# Patient Record
Sex: FEMALE | Race: WHITE | NOT HISPANIC OR LATINO | Employment: UNEMPLOYED | URBAN - METROPOLITAN AREA
[De-identification: names, ages, dates, MRNs, and addresses within clinical notes are randomized per-mention and may not be internally consistent; named-entity substitution may affect disease eponyms.]

---

## 2017-01-23 ENCOUNTER — OFFICE VISIT (OUTPATIENT)
Dept: URGENT CARE | Facility: CLINIC | Age: 47
End: 2017-01-23
Payer: COMMERCIAL

## 2017-01-23 VITALS
HEIGHT: 67 IN | TEMPERATURE: 98 F | OXYGEN SATURATION: 94 % | WEIGHT: 204.6 LBS | SYSTOLIC BLOOD PRESSURE: 118 MMHG | HEART RATE: 81 BPM | RESPIRATION RATE: 14 BRPM | DIASTOLIC BLOOD PRESSURE: 78 MMHG | BODY MASS INDEX: 32.11 KG/M2

## 2017-01-23 DIAGNOSIS — H00.012 HORDEOLUM EXTERNUM OF RIGHT LOWER EYELID: ICD-10-CM

## 2017-01-23 PROCEDURE — 99214 OFFICE O/P EST MOD 30 MIN: CPT | Performed by: FAMILY MEDICINE

## 2017-01-23 RX ORDER — SULFACETAMIDE SODIUM 100 MG/ML
1 SOLUTION/ DROPS OPHTHALMIC
Qty: 1 BOTTLE | Refills: 0 | Status: SHIPPED | OUTPATIENT
Start: 2017-01-23 | End: 2017-03-28

## 2017-01-23 ASSESSMENT — ENCOUNTER SYMPTOMS
DOUBLE VISION: 0
EYE REDNESS: 1
FEVER: 0
BLURRED VISION: 0
EYE DISCHARGE: 0

## 2017-01-23 NOTE — PROGRESS NOTES
"Subjective:      Daly Ambrosio is a 46 y.o. female who presents with Eye Problem            Eye Problem   The right eye is affected. This is a new problem. The current episode started in the past 7 days. The problem occurs constantly. The problem has been unchanged. There was no injury mechanism. The pain is mild. There is no known exposure to pink eye. She does not wear contacts. Associated symptoms include eye redness. Pertinent negatives include no blurred vision, eye discharge, double vision, fever or itching. She has tried nothing for the symptoms.     Social History   Substance Use Topics   • Smoking status: Current Every Day Smoker -- 0.50 packs/day     Types: Cigarettes   • Smokeless tobacco: Never Used   • Alcohol Use: 0.0 oz/week     0 Standard drinks or equivalent per week      Comment: 5 PER YR     Past Medical History   Diagnosis Date   • Indigestion    • Snoring      has had sleep study   • Anesthesia      difficulty waking,  combative when waking   • Hiatus hernia syndrome    • Sleep apnea      no cpap   • thyroidectomy 2009     benign lymphocytic thyroiditis   • Psychiatric problem      bi-polar   • Hypothyroidism, postsurgical    • Cigarette smoker      Family History   Problem Relation Age of Onset   • Cancer Mother    • Lung Disease Mother      COPD   • Heart Disease Mother    • Cancer Maternal Grandmother        Review of Systems   Constitutional: Negative for fever.   Eyes: Positive for redness. Negative for blurred vision, double vision and discharge.   Skin: Negative for itching.   All other systems reviewed and are negative.         Objective:     /78 mmHg  Pulse 81  Temp(Src) 36.7 °C (98 °F)  Resp 14  Ht 1.702 m (5' 7.01\")  Wt 92.806 kg (204 lb 9.6 oz)  BMI 32.04 kg/m2  SpO2 94%     Physical Exam   Constitutional: She is oriented to person, place, and time. She appears well-developed and well-nourished. No distress.   HENT:   Head: Normocephalic and atraumatic. "   Eyes: Conjunctivae and EOM are normal. Pupils are equal, round, and reactive to light. Right conjunctiva is not injected.       Cardiovascular: Normal rate.    Pulmonary/Chest: Effort normal.   Neurological: She is alert and oriented to person, place, and time.   Skin: Skin is warm. She is not diaphoretic. No erythema.   Nursing note and vitals reviewed.              Assessment/Plan:     1. Hordeolum externum of right lower eyelid  -warm compress  - sulfacetamide (SULAMYD) 10 % Solution; Place 1 Drop in both eyes every 3 hours.  Dispense: 1 Bottle; Refill: 0    Follow up in one week if no improvement, sooner if symptoms worsen.

## 2017-01-23 NOTE — MR AVS SNAPSHOT
"        Daly Ambrosio   2017 1:30 PM   Office Visit   MRN: [id number]    Department:  [facility] [place]   Dept Phone:  [phone]    Description:  Female : 1970   Provider:  Trent Noriega M.D.           Reason for Visit     Eye Problem rt eye pain, no drainage, x2days, swelling,         Allergies as of 2017     Allergen Noted Reactions    Codeine 2009   Vomiting    Norco [Hydrocodone-Acetaminophen] 2012   Vomiting    Other Misc 2016       adhesive    Penicillins 2009   Rash    Tape 2009       Causes blisters - can't do regular or paper tape - only coban    Percocet [Oxycodone-Acetaminophen] 2009   Itching      You were diagnosed with     Hordeolum externum of right lower eyelid   [543064]         Vital Signs     Blood Pressure Pulse Temperature Respirations Height Weight    118/78 mmHg 81 36.7 °C (98 °F) 14 1.702 m (5' 7.01\") 92.806 kg (204 lb 9.6 oz)    Body Mass Index Oxygen Saturation Smoking Status             32.04 kg/m2 94% Current Every Day Smoker         Basic Information     Date Of Birth Sex Race Ethnicity Preferred Language    1970 Female White Non- English      Problem List              ICD-10-CM Priority Class Noted - Resolved    ACL (anterior cruciate ligament) rupture S83.519A   10/9/2012 - Present    Insomnia G47.00   2012 - Present    Carpal tunnel syndrome G56.00   2012 - Present    Mood disorder (CMS-HCC) F39   2012 - Present    Dysphagia    2013 - Present    Dyslipidemia E78.5   2013 - Present    Swallowing difficulty R13.10   5/10/2013 - Present    Sinusitis J32.9   2013 - Present    Enlarged lymph nodes R59.9   2014 - Present    Hypothyroidism, postsurgical E89.0   Unknown - Present    Cigarette smoker F17.210   Unknown - Present    Polyp, vagina N84.2   2015 - Present    Perimenopausal vasomotor symptoms N95.1   2016 - Present      Health Maintenance       " Date Due Completion Dates    IMM DTaP/Tdap/Td Vaccine (1 - Tdap) 11/30/1989 ---    IMM PNEUMOCOCCAL 19-64 (ADULT) MEDIUM RISK SERIES (1 of 1 - PPSV23) 11/30/1989 ---    IMM INFLUENZA (1) 9/1/2016 ---    PAP SMEAR 1/24/2017 1/24/2014    MAMMOGRAM 10/31/2017 10/31/2016, 10/20/2015, 9/5/2014, 5/22/2013, 5/22/2013, 5/22/2013            Current Immunizations     No immunizations on file.      Below and/or attached are the medications your provider expects you to take. Review all of your home medications and newly ordered medications with your provider and/or pharmacist. Follow medication instructions as directed by your provider and/or pharmacist. Please keep your medication list with you and share with your provider. Update the information when medications are discontinued, doses are changed, or new medications (including over-the-counter products) are added; and carry medication information at all times in the event of emergency situations     Allergies:  CODEINE - Vomiting     NORCO - Vomiting     OTHER MISC - (reactions not documented)     PENICILLINS - Rash     TAPE - (reactions not documented)     PERCOCET - Itching               Medications  Valid as of: January 23, 2017 -  3:00 PM    Generic Name Brand Name Tablet Size Instructions for use    Estradiol (Tab) ESTRACE 2 MG Take 1 Tab by mouth every day.        LamoTRIgine (Tab) LAMICTAL 200 MG Take 1 Tab by mouth every day.        Levothyroxine Sodium (Tab) SYNTHROID 200 MCG Take 1 Tab by mouth every day.        Liothyronine Sodium (Tab) CYTOMEL 25 MCG Take 1 Tab by mouth every day.        LORazepam (Tab) ATIVAN 1 MG TAKE 1/2-1 TABLET BY MOUTH 2 TIMES A DAY AS NEEDED        Sulfacetamide Sodium (Solution) SULAMYD 10 % Place 1 Drop in both eyes every 3 hours.        .                 Medicines prescribed today were sent to:     [facility]/PHARMACY #[id number] - [place] - [address]    [address] [place] [address]    Phone: [phone] Fax: [phone]    Open  24 Hours?: No      Medication refill instructions:       If your prescription bottle indicates you have medication refills left, it is not necessary to call your provider’s office. Please contact your pharmacy and they will refill your medication.    If your prescription bottle indicates you do not have any refills left, you may request refills at any time through one of the following ways: The online [employer] system (except Urgent Care), by calling your provider’s office, or by asking your pharmacy to contact your provider’s office with a refill request. Medication refills are processed only during regular business hours and may not be available until the next business day. Your provider may request additional information or to have a follow-up visit with you prior to refilling your medication.   *Please Note: Medication refills are assigned a new Rx number when refilled electronically. Your pharmacy may indicate that no refills were authorized even though a new prescription for the same medication is available at the pharmacy. Please request the medicine by name with the pharmacy before contacting your provider for a refill.           [employer] Access Code: Activation code not generated  Current [employer] Status: Active

## 2017-02-13 DIAGNOSIS — H00.012 HORDEOLUM EXTERNUM OF RIGHT LOWER EYELID: ICD-10-CM

## 2017-03-28 ENCOUNTER — OFFICE VISIT (OUTPATIENT)
Dept: MEDICAL GROUP | Facility: PHYSICIAN GROUP | Age: 47
End: 2017-03-28
Payer: COMMERCIAL

## 2017-03-28 VITALS
DIASTOLIC BLOOD PRESSURE: 70 MMHG | OXYGEN SATURATION: 94 % | HEART RATE: 84 BPM | TEMPERATURE: 99.1 F | RESPIRATION RATE: 18 BRPM | WEIGHT: 203 LBS | HEIGHT: 67 IN | BODY MASS INDEX: 31.86 KG/M2 | SYSTOLIC BLOOD PRESSURE: 112 MMHG

## 2017-03-28 DIAGNOSIS — G47.30 SLEEP-DISORDERED BREATHING: ICD-10-CM

## 2017-03-28 DIAGNOSIS — E78.5 DYSLIPIDEMIA: ICD-10-CM

## 2017-03-28 DIAGNOSIS — E89.0 HYPOTHYROIDISM, POSTSURGICAL: ICD-10-CM

## 2017-03-28 DIAGNOSIS — E66.9 OBESITY (BMI 30-39.9): ICD-10-CM

## 2017-03-28 DIAGNOSIS — R40.0 DAYTIME SOMNOLENCE: ICD-10-CM

## 2017-03-28 DIAGNOSIS — G43.909 MIGRAINE WITHOUT STATUS MIGRAINOSUS, NOT INTRACTABLE, UNSPECIFIED MIGRAINE TYPE: ICD-10-CM

## 2017-03-28 DIAGNOSIS — F17.210 CIGARETTE SMOKER: ICD-10-CM

## 2017-03-28 DIAGNOSIS — N95.1 PERIMENOPAUSAL VASOMOTOR SYMPTOMS: ICD-10-CM

## 2017-03-28 PROCEDURE — 99214 OFFICE O/P EST MOD 30 MIN: CPT | Performed by: FAMILY MEDICINE

## 2017-03-28 RX ORDER — ELETRIPTAN HYDROBROMIDE 40 MG/1
40 TABLET, FILM COATED ORAL
Qty: 10 TAB | Refills: 5 | Status: SHIPPED | OUTPATIENT
Start: 2017-03-28 | End: 2017-10-17

## 2017-03-28 NOTE — MR AVS SNAPSHOT
"        Daly Ambrosio   3/28/2017 9:40 AM   Office Visit   MRN: [id number]    Department:  [facility]   Dept Phone:  [phone]    Description:  Female : 1970   Provider:  Jade Talley M.D.           Reason for Visit     Follow-Up meds      Allergies as of 3/28/2017     Allergen Noted Reactions    Codeine 2009   Vomiting    Norco [Hydrocodone-Acetaminophen] 2012   Vomiting    Other Misc 2016       adhesive    Penicillins 2009   Rash    Tape 2009       Causes blisters - can't do regular or paper tape - only coban    Percocet [Oxycodone-Acetaminophen] 2009   Itching      You were diagnosed with     Hypothyroidism, postsurgical   [511438]       Perimenopausal vasomotor symptoms   [714247]       Cigarette smoker   [407227]       Dyslipidemia   [151944]       Sleep-disordered breathing   [101405]       Daytime somnolence   [137848]       Migraine without status migrainosus, not intractable, unspecified migraine type   [2369151]       Obesity (BMI 30-39.9)   [529626]         Vital Signs     Blood Pressure Pulse Temperature Respirations Height Weight    112/70 mmHg 84 37.3 °C (99.1 °F) 18 1.702 m (5' 7.01\") 92.08 kg (203 lb)    Body Mass Index Oxygen Saturation Smoking Status             31.79 kg/m2 94% Current Every Day Smoker         Basic Information     Date Of Birth Sex Race Ethnicity Preferred Language    1970 Female White Non- English      Problem List              ICD-10-CM Priority Class Noted - Resolved    ACL (anterior cruciate ligament) rupture S83.519A   10/9/2012 - Present    Insomnia G47.00   2012 - Present    Carpal tunnel syndrome G56.00   2012 - Present    Mood disorder (CMS-HCC) F39   2012 - Present    Dysphagia    2013 - Present    Dyslipidemia E78.5   2013 - Present    Swallowing difficulty R13.10   5/10/2013 - Present    Sinusitis J32.9   2013 - Present    Enlarged lymph nodes R59.9  "  11/5/2014 - Present    Hypothyroidism, postsurgical E89.0   Unknown - Present    Cigarette smoker F17.210   Unknown - Present    Polyp, vagina N84.2   6/11/2015 - Present    Perimenopausal vasomotor symptoms N95.1   6/7/2016 - Present    Obesity (BMI 30-39.9) E66.9   3/28/2017 - Present      Health Maintenance        Date Due Completion Dates    IMM DTaP/Tdap/Td Vaccine (1 - Tdap) 11/30/1989 ---    IMM PNEUMOCOCCAL 19-64 (ADULT) MEDIUM RISK SERIES (1 of 1 - PPSV23) 11/30/1989 ---    IMM INFLUENZA (1) 9/1/2016 ---    PAP SMEAR 1/24/2017 1/24/2014    MAMMOGRAM 10/31/2017 10/31/2016, 10/20/2015, 9/5/2014, 5/22/2013, 5/22/2013, 5/22/2013            Current Immunizations     No immunizations on file.      Below and/or attached are the medications your provider expects you to take. Review all of your home medications and newly ordered medications with your provider and/or pharmacist. Follow medication instructions as directed by your provider and/or pharmacist. Please keep your medication list with you and share with your provider. Update the information when medications are discontinued, doses are changed, or new medications (including over-the-counter products) are added; and carry medication information at all times in the event of emergency situations     Allergies:  CODEINE - Vomiting     NORCO - Vomiting     OTHER MISC - (reactions not documented)     PENICILLINS - Rash     TAPE - (reactions not documented)     PERCOCET - Itching               Medications  Valid as of: March 28, 2017 -  1:25 PM    Generic Name Brand Name Tablet Size Instructions for use    Eletriptan Hydrobromide (Tab) RELPAX 40 MG Take 1 Tab by mouth Once PRN for up to 1 dose.        Estradiol (Tab) ESTRACE 2 MG Take 1 Tab by mouth every day.        LamoTRIgine (Tab) LAMICTAL 200 MG Take 1 Tab by mouth every day.        Levothyroxine Sodium (Tab) SYNTHROID 200 MCG Take 1 Tab by mouth every day.        Liothyronine Sodium (Tab) CYTOMEL 25 MCG Take  1 Tab by mouth every day.        LORazepam (Tab) ATIVAN 1 MG TAKE 1/2-1 TABLET BY MOUTH 2 TIMES A DAY AS NEEDED        Varenicline Tartrate (Misc) CHANTIX NATASHA 0.5 MG X 11 & 1 MG X 42 As directed        .                 Medicines prescribed today were sent to:     [facility]/PHARMACY #[id number] - [place], [place] - [address]    [address] [place] [address]    Phone: [phone] Fax: [phone]    Open 24 Hours?: No      Medication refill instructions:       If your prescription bottle indicates you have medication refills left, it is not necessary to call your provider’s office. Please contact your pharmacy and they will refill your medication.    If your prescription bottle indicates you do not have any refills left, you may request refills at any time through one of the following ways: The online [employer] system (except Urgent Care), by calling your provider’s office, or by asking your pharmacy to contact your provider’s office with a refill request. Medication refills are processed only during regular business hours and may not be available until the next business day. Your provider may request additional information or to have a follow-up visit with you prior to refilling your medication.   *Please Note: Medication refills are assigned a new Rx number when refilled electronically. Your pharmacy may indicate that no refills were authorized even though a new prescription for the same medication is available at the pharmacy. Please request the medicine by name with the pharmacy before contacting your provider for a refill.        Your To Do List     Future Labs/Procedures Complete By Expires    COMP METABOLIC PANEL  As directed 3/28/2018    LIPID PROFILE  As directed 3/28/2018    TSH WITH REFLEX TO FT4  As directed 3/28/2018    VITAMIN D,25 HYDROXY  As directed 3/28/2018         MyChart Access Code: Activation code not generated  Current [employer] Status: Active          Quit Tobacco Information     Do you want to  quit using tobacco?    Quitting tobacco decreases risks of cancer, heart and lung disease, increases life expectancy, improves sense of taste and smell, and increases spending money, among other benefits.    If you are thinking about quitting, we can help.  • [facility] Quit Tobacco Program: [phone]  o Program occurs weekly for four weeks and includes pharmacist consultation on products to support quitting smoking or chewing tobacco. A provider referral is needed for pharmacist consultation.  • Tobacco Users Help Hotline: 7-833-QUIT-NOW (903-0700) or https://[place].quitlogix.org/  o Free, confidential telephone and online coaching for [place] residents. Sessions are designed on a schedule that is convenient for you. Eligible clients receive free nicotine replacement therapy.  • Nationally: www.smokefree.gov  o Information and professional assistance to support both immediate and long-term needs as you become, and remain, a non-smoker. Smokefree.gov allows you to choose the help that best fits your needs.

## 2017-03-28 NOTE — PROGRESS NOTES
Chief Complaint   Patient presents with   • Follow-Up     meds       HISTORY OF PRESENT ILLNESS: Patient is a 46 y.o. female established patient here today for the following concerns:    1. Hypothyroidism, postsurgical  Here for follow up.  Continues on the levothyroxine and Cytomel.  Previous Labs showed abnormal TSH.  She did try buccal administration to attempt better absorption.  Reports good adherence.  Due for labs.  Still struggling to control weight.     2. Perimenopausal vasomotor symptoms  Continues on estradiol.  Discussed that she is at increased risk for blood clots if she continues to smoke while taking hormone replacement.  No leg swelling, calf pain or SOB.     3. Cigarette smoker  Smoking 1/2 ppd.  Tried wellbutrin previously without help.  Has tried nicotine replacement.  Has not tried Chantix.      4. Dyslipidemia  Noted to be elevated before.  Due for recheck . Denies any chest pains.     5. Sleep-disordered breathing  6. Daytime somnolence  Reports that she has been struggling to stay awake if she is in a dark room, watching TV or even if the traffic light is too long.  Does snore.  Reports previous sleep study but did not get good sleep during it about 7 years ago.      7. Migraine without status migrainosus, not intractable, unspecified migraine type  Has noted increase in migraines.  Has been using ibuprofen to hep with it.  No sinus pressure or drainage.  Associated with light and noise sensitivity.  Relieved with NSAIDs temporarily.      8. Obesity (BMI 30-39.9)  Counseled.  Patient would like to try phentermine.  Discussed that this would not be reasonable with her smoking.        Past Medical, Social, and Family history reviewed and updated in EPIC    Allergies:Codeine; Norco; Other misc; Penicillins; Tape; and Percocet    Current Outpatient Prescriptions   Medication Sig Dispense Refill   • varenicline (CHANTIX STARTING MONTH PAK) 0.5 MG X 11 & 1 MG X 42 tablet As directed 56 Tab 3   •  "eletriptan (RELPAX) 40 MG tablet Take 1 Tab by mouth Once PRN for up to 1 dose. 10 Tab 5   • lorazepam (ATIVAN) 1 MG Tab TAKE 1/2-1 TABLET BY MOUTH 2 TIMES A DAY AS NEEDED 30 Tab 0   • liothyronine (CYTOMEL) 25 MCG Tab Take 1 Tab by mouth every day. 90 Tab 3   • lamotrigine (LAMICTAL) 200 MG tablet Take 1 Tab by mouth every day. 90 Tab 3   • estradiol (ESTRACE) 2 MG Tab Take 1 Tab by mouth every day. 90 Tab 3   • SYNTHROID 200 MCG Tab Take 1 Tab by mouth every day. 90 Tab 3     No current facility-administered medications for this visit.         ROS:  Review of Systems   Constitutional: Negative for fever, chills, weight loss and malaise/fatigue.   HENT: Negative for ear pain, nosebleeds, congestion, sore throat and neck pain.    Eyes: Negative for blurred vision.   Respiratory: Negative for cough, sputum production, shortness of breath and wheezing.    Cardiovascular: Negative for chest pain, palpitations,  and leg swelling.   Gastrointestinal: Negative for heartburn, nausea, vomiting, diarrhea and abdominal pain.   Genitourinary: Negative for dysuria, urgency and frequency.   Musculoskeletal: Negative for myalgias, back pain and joint pain.   Skin: Negative for rash and itching.   Neurological: Negative for dizziness, tingling, tremors, sensory change, focal weakness and headaches.   Endo/Heme/Allergies: Does not bruise/bleed easily.   Psychiatric/Behavioral: Negative for depression, anxiety, suicidal ideas, insomnia and memory loss.      Exam:  Blood pressure 112/70, pulse 84, temperature 37.3 °C (99.1 °F), resp. rate 18, height 1.702 m (5' 7.01\"), weight 92.08 kg (203 lb), SpO2 94 %.    General:  Well nourished, well developed in NAD  Head is grossly normal.  Neck: Supple without JVD   Pulmonary:  Normal effort.   Cardiovascular: Regular rate  Extremities: no clubbing, cyanosis, or edema.  Psych: affect appropriate      Please note that this dictation was created using voice recognition software. I have made " every reasonable attempt to correct obvious errors, but I expect that there are errors of grammar and possibly content that I did not discover before finalizing the note.    Assessment/Plan:  1. Hypothyroidism, postsurgical  Continue current dose, check labs.   - TSH WITH REFLEX TO FT4; Future  - VITAMIN D,25 HYDROXY; Future    2. Perimenopausal vasomotor symptoms  Continue E2, but with caution.  Need to quit smoking   - VITAMIN D,25 HYDROXY; Future    3. Cigarette smoker  Counseled.    - varenicline (CHANTIX STARTING MONTH NATASHA) 0.5 MG X 11 & 1 MG X 42 tablet; As directed  Dispense: 56 Tab; Refill: 3    4. Dyslipidemia  Check levels.   - COMP METABOLIC PANEL; Future  - LIPID PROFILE; Future    5. Sleep-disordered breathing  OPO/Apnea link ordered     6. Daytime somnolence  As above     7. Migraine without status migrainosus, not intractable, unspecified migraine type  Trial of  - eletriptan (RELPAX) 40 MG tablet; Take 1 Tab by mouth Once PRN for up to 1 dose.  Dispense: 10 Tab; Refill: 5  Suspect JESSICA the culprit.    8. Obesity (BMI 30-39.9)  Counseled.  Denied request for phentermine until she has been quit smoking.   - Patient identified as having weight management issue.  Appropriate orders and counseling given.    Follow up in 3-6 months, sooner prn

## 2017-04-04 ENCOUNTER — TELEPHONE (OUTPATIENT)
Dept: MEDICAL GROUP | Facility: PHYSICIAN GROUP | Age: 47
End: 2017-04-04

## 2017-04-04 ENCOUNTER — HOSPITAL ENCOUNTER (OUTPATIENT)
Dept: LAB | Facility: MEDICAL CENTER | Age: 47
End: 2017-04-04
Attending: FAMILY MEDICINE
Payer: COMMERCIAL

## 2017-04-04 DIAGNOSIS — N84.1 CERVICAL POLYP: ICD-10-CM

## 2017-04-04 DIAGNOSIS — N95.1 PERIMENOPAUSAL VASOMOTOR SYMPTOMS: ICD-10-CM

## 2017-04-04 DIAGNOSIS — R79.89 ABNORMAL CBC: ICD-10-CM

## 2017-04-04 DIAGNOSIS — E78.5 DYSLIPIDEMIA: ICD-10-CM

## 2017-04-04 DIAGNOSIS — E89.0 HYPOTHYROIDISM, POSTSURGICAL: ICD-10-CM

## 2017-04-04 LAB
25(OH)D3 SERPL-MCNC: 12 NG/ML (ref 30–100)
ALBUMIN SERPL BCP-MCNC: 4.3 G/DL (ref 3.2–4.9)
ALBUMIN/GLOB SERPL: 1.2 G/DL
ALP SERPL-CCNC: 84 U/L (ref 30–99)
ALT SERPL-CCNC: 19 U/L (ref 2–50)
ANION GAP SERPL CALC-SCNC: 7 MMOL/L (ref 0–11.9)
AST SERPL-CCNC: 20 U/L (ref 12–45)
BILIRUB SERPL-MCNC: 0.4 MG/DL (ref 0.1–1.5)
BUN SERPL-MCNC: 10 MG/DL (ref 8–22)
CALCIUM SERPL-MCNC: 8.6 MG/DL (ref 8.5–10.5)
CHLORIDE SERPL-SCNC: 102 MMOL/L (ref 96–112)
CHOLEST SERPL-MCNC: 255 MG/DL (ref 100–199)
CO2 SERPL-SCNC: 28 MMOL/L (ref 20–33)
CREAT SERPL-MCNC: 0.87 MG/DL (ref 0.5–1.4)
GFR SERPL CREATININE-BSD FRML MDRD: >60 ML/MIN/1.73 M 2
GLOBULIN SER CALC-MCNC: 3.7 G/DL (ref 1.9–3.5)
GLUCOSE SERPL-MCNC: 72 MG/DL (ref 65–99)
HDLC SERPL-MCNC: 51 MG/DL
LDLC SERPL CALC-MCNC: 162 MG/DL
POTASSIUM SERPL-SCNC: 4.5 MMOL/L (ref 3.6–5.5)
PROT SERPL-MCNC: 8 G/DL (ref 6–8.2)
SODIUM SERPL-SCNC: 137 MMOL/L (ref 135–145)
T4 FREE SERPL-MCNC: 0.56 NG/DL (ref 0.53–1.43)
TRIGL SERPL-MCNC: 210 MG/DL (ref 0–149)
TSH SERPL DL<=0.005 MIU/L-ACNC: 12.8 UIU/ML (ref 0.3–3.7)

## 2017-04-04 PROCEDURE — 82306 VITAMIN D 25 HYDROXY: CPT

## 2017-04-04 PROCEDURE — 36415 COLL VENOUS BLD VENIPUNCTURE: CPT

## 2017-04-04 PROCEDURE — 84443 ASSAY THYROID STIM HORMONE: CPT

## 2017-04-04 PROCEDURE — 80061 LIPID PANEL: CPT

## 2017-04-04 PROCEDURE — 84439 ASSAY OF FREE THYROXINE: CPT

## 2017-04-04 PROCEDURE — 80053 COMPREHEN METABOLIC PANEL: CPT

## 2017-04-04 NOTE — TELEPHONE ENCOUNTER
Patient is asking if you can order a CBC?  She wants her red blood count and white blood count.  Please advise.    Thank you.

## 2017-04-05 ENCOUNTER — TELEPHONE (OUTPATIENT)
Dept: MEDICAL GROUP | Facility: PHYSICIAN GROUP | Age: 47
End: 2017-04-05

## 2017-04-05 NOTE — TELEPHONE ENCOUNTER
----- Message from Jade Talley M.D. sent at 4/5/2017  8:49 AM PDT -----  Please call patient to ensure they received their results through My Chart.  The patient requires follow up office visit.

## 2017-04-19 ENCOUNTER — TELEPHONE (OUTPATIENT)
Dept: MEDICAL GROUP | Facility: PHYSICIAN GROUP | Age: 47
End: 2017-04-19

## 2017-04-19 DIAGNOSIS — G47.30 SLEEP-DISORDERED BREATHING: ICD-10-CM

## 2017-04-20 ENCOUNTER — TELEPHONE (OUTPATIENT)
Dept: MEDICAL GROUP | Facility: PHYSICIAN GROUP | Age: 47
End: 2017-04-20

## 2017-04-20 NOTE — TELEPHONE ENCOUNTER
Please call Daly.  Her oxygen test showed very low oxygen levels at night and evidence of sleep apnea.  We need to start nocturnal oxygen and get her over for full sleep study

## 2017-04-20 NOTE — TELEPHONE ENCOUNTER
1. Caller Name: [facility]                                         Call Back Number: [phone]      Patient approves a detailed voicemail message: N\A    Inés states they received an order for O2 and states they need the dosage.  She also states they need an addendum to progress notes that addresses OPO results.

## 2017-04-20 NOTE — TELEPHONE ENCOUNTER
2 L by nasal cannula     Patient was found to have nocturnal hypoxia on most recent NocOx for day time sedation and snoring.      Dx: sleep disordered breathing         Nocturnal hypoxia      A/P: start 2 L O2 by nasal canula    Referral placed to Pulmonology for full sleep study.

## 2017-05-10 ENCOUNTER — GYNECOLOGY VISIT (OUTPATIENT)
Dept: OBGYN | Facility: CLINIC | Age: 47
End: 2017-05-10
Payer: COMMERCIAL

## 2017-05-10 ENCOUNTER — HOSPITAL ENCOUNTER (OUTPATIENT)
Facility: MEDICAL CENTER | Age: 47
End: 2017-05-10
Attending: OBSTETRICS & GYNECOLOGY
Payer: COMMERCIAL

## 2017-05-10 VITALS
BODY MASS INDEX: 31.71 KG/M2 | DIASTOLIC BLOOD PRESSURE: 62 MMHG | SYSTOLIC BLOOD PRESSURE: 104 MMHG | HEIGHT: 67 IN | WEIGHT: 202 LBS

## 2017-05-10 DIAGNOSIS — R10.2 PELVIC PAIN: ICD-10-CM

## 2017-05-10 LAB — CYTOLOGY REG CYTOL: NORMAL

## 2017-05-10 PROCEDURE — 88175 CYTOPATH C/V AUTO FLUID REDO: CPT

## 2017-05-10 PROCEDURE — 99213 OFFICE O/P EST LOW 20 MIN: CPT | Performed by: OBSTETRICS & GYNECOLOGY

## 2017-05-10 NOTE — MR AVS SNAPSHOT
"        Daly Onealtaker   5/10/2017 1:00 PM   Gynecology Visit   MRN: [id number]    Department:  [facility]   Dept Phone:  [phone]    Description:  Female : 1970   Provider:  Sofy Lira M.D.           Allergies as of 5/10/2017     Allergen Noted Reactions    Codeine 2009   Vomiting    Norco [Hydrocodone-Acetaminophen] 2012   Vomiting    Other Misc 2016       adhesive    Penicillins 2009   Rash    Tape 2009       Causes blisters - can't do regular or paper tape - only coban    Percocet [Oxycodone-Acetaminophen] 2009   Itching      You were diagnosed with     Pelvic pain   [[id number]]         Vital Signs     Blood Pressure Height Weight Body Mass Index Smoking Status       104/62 mmHg 1.702 m (5' 7\") 91.627 kg (202 lb) 31.63 kg/m2 Current Every Day Smoker       Basic Information     Date Of Birth Sex Race Ethnicity Preferred Language    1970 Female White Non- English      Your appointments     2017  9:45 AM   GYN Visit with Sofy Lira M.D.   [facility] ([address], [address]  [facility] [id number] [phone]            2017  1:20 PM   New Patient with PERLA Gibbs   [facility] (--)    [address]  [facility] [id number] [phone]           Please bring enclosed paperwork completed along with your insurance card and photo ID.              Problem List              ICD-10-CM Priority Class Noted - Resolved    ACL (anterior cruciate ligament) rupture S83.519A   10/9/2012 - Present    Insomnia G47.00   2012 - Present    Carpal tunnel syndrome G56.00   2012 - Present    Mood disorder (CMS-HCC) F39   2012 - Present    Dysphagia    2013 - Present    Dyslipidemia E78.5   2013 - Present    Swallowing difficulty R13.10   5/10/2013 - Present    Sinusitis J32.9   2013 - Present  "    Enlarged lymph nodes R59.9   11/5/2014 - Present    Hypothyroidism, postsurgical E89.0   Unknown - Present    Cigarette smoker F17.210   Unknown - Present    Polyp, vagina N84.2   6/11/2015 - Present    Perimenopausal vasomotor symptoms N95.1   6/7/2016 - Present    Obesity (BMI 30-39.9) E66.9   3/28/2017 - Present      Health Maintenance        Date Due Completion Dates    IMM DTaP/Tdap/Td Vaccine (1 - Tdap) 11/30/1989 ---    IMM PNEUMOCOCCAL 19-64 (ADULT) MEDIUM RISK SERIES (1 of 1 - PPSV23) 11/30/1989 ---    PAP SMEAR 1/24/2017 1/24/2014    MAMMOGRAM 10/31/2017 10/31/2016, 10/20/2015, 9/5/2014, 5/22/2013            Current Immunizations     No immunizations on file.      Below and/or attached are the medications your provider expects you to take. Review all of your home medications and newly ordered medications with your provider and/or pharmacist. Follow medication instructions as directed by your provider and/or pharmacist. Please keep your medication list with you and share with your provider. Update the information when medications are discontinued, doses are changed, or new medications (including over-the-counter products) are added; and carry medication information at all times in the event of emergency situations     Allergies:  CODEINE - Vomiting     NORCO - Vomiting     OTHER MISC - (reactions not documented)     PENICILLINS - Rash     TAPE - (reactions not documented)     PERCOCET - Itching               Medications  Valid as of: May 10, 2017 -  1:35 PM    Generic Name Brand Name Tablet Size Instructions for use    Eletriptan Hydrobromide (Tab) RELPAX 40 MG Take 1 Tab by mouth Once PRN for up to 1 dose.        Estradiol (Tab) ESTRACE 2 MG Take 1 Tab by mouth every day.        LamoTRIgine (Tab) LAMICTAL 200 MG Take 1 Tab by mouth every day.        Levothyroxine Sodium (Tab) SYNTHROID 200 MCG Take 1 Tab by mouth every day.        Liothyronine Sodium (Tab) CYTOMEL 25 MCG Take 1 Tab by mouth every day.         LORazepam (Tab) ATIVAN 1 MG TAKE 1/2-1 TABLET BY MOUTH 2 TIMES A DAY AS NEEDED        Varenicline Tartrate (Misc) CHANTIX NATASHA 0.5 MG X 11 & 1 MG X 42 As directed        .                 Medicines prescribed today were sent to:     [facility]/PHARMACY #[id number] - [place], [place] - [address]    [address] [place] [address]    Phone: [phone] Fax: [phone]    Open 24 Hours?: No      Medication refill instructions:       If your prescription bottle indicates you have medication refills left, it is not necessary to call your provider’s office. Please contact your pharmacy and they will refill your medication.    If your prescription bottle indicates you do not have any refills left, you may request refills at any time through one of the following ways: The online [employer] system (except Urgent Care), by calling your provider’s office, or by asking your pharmacy to contact your provider’s office with a refill request. Medication refills are processed only during regular business hours and may not be available until the next business day. Your provider may request additional information or to have a follow-up visit with you prior to refilling your medication.   *Please Note: Medication refills are assigned a new Rx number when refilled electronically. Your pharmacy may indicate that no refills were authorized even though a new prescription for the same medication is available at the pharmacy. Please request the medicine by name with the pharmacy before contacting your provider for a refill.        Your To Do List     Future Labs/Procedures Complete By Expires    CT-ABDOMEN-PELVIS WITH & W/O  As directed 5/10/2018         [employer] Access Code: Activation code not generated  Current [employer] Status: Active          Quit Tobacco Information     Do you want to quit using tobacco?    Quitting tobacco decreases risks of cancer, heart and lung disease, increases life expectancy, improves sense of taste and smell, and  increases spending money, among other benefits.    If you are thinking about quitting, we can help.  • [facility] Quit Tobacco Program: [phone]  o Program occurs weekly for four weeks and includes pharmacist consultation on products to support quitting smoking or chewing tobacco. A provider referral is needed for pharmacist consultation.  • Tobacco Users Help Hotline: 4-421-QUIT-NOW (174-7472) or https://[place].quitlogix.org/  o Free, confidential telephone and online coaching for [place] residents. Sessions are designed on a schedule that is convenient for you. Eligible clients receive free nicotine replacement therapy.  • Nationally: www.smokefree.gov  o Information and professional assistance to support both immediate and long-term needs as you become, and remain, a non-smoker. Smokefree.gov allows you to choose the help that best fits your needs.

## 2017-05-10 NOTE — PROGRESS NOTES
" Daly Ambrosio46 y.o.  female presents for pelvic pain  Patient's last menstrual period was sometime in  when she underwent total abdominal hysterectomy with left salpingo-oophorectomy, she then some months later had a right salpingo-oophorectomy she states secondary to endometriosis. They did not remove her cervix however. She states that she has a history of cervical polyps and she is pretty sure she has one again.  Patient describes lower back and pelvic pain almost like a menstrual cramp that has been going on for 3 months. She describes the pain as intermittent coming every 1-2 weeks and lasting for a few hours. She does use Advil with some relief but the pain does not completely go away does resolve eventually on its own. She also states that she may have a headache associated with the pain. But no other aggravating or relieving symptoms. The patient does not notice anything that precipitates the pain.      Past Medical History   Diagnosis Date   • Indigestion    • Snoring      has had sleep study   • Anesthesia      difficulty waking,  combative when waking   • Hiatus hernia syndrome    • Sleep apnea      no cpap   • thyroidectomy 2009     benign lymphocytic thyroiditis   • Psychiatric problem      bi-polar   • Hypothyroidism, postsurgical    • Cigarette smoker      status post LUIS A and BSO  Allergy:   Codeine; Norco; Other misc; Penicillins; Tape; and Percocet    LMP:       No LMP recorded. Patient has had a hysterectomy. .     Menstrual History: s/p hysterectomy  Contraceptive Method:none    Pap history, the patient denies abnormal Pap smears and denies   sexually transmitted diseases    Mammo:recent    Objective : The patient appears well, alert and oriented x 3, in no acute distress.  Blood pressure 104/62, height 1.702 m (5' 7\"), weight 91.627 kg (202 lb).  HEENT Exam: EOMI, KARLI, no adenopathy or thyromegaly.  Lungs: Clear to Auscultation   Cor: S1 and S2 normal, no murmurs, or " rubs   Abdomen: Soft without tenderness, guarding mass or organomegaly.  Extremities: No edema, pulses equal  Neurological: Normal No focal signs  Breast Exam:deferred  Pelvic: External genitalia,urethral meatus, urethra, bladder and vagina normal. Cervix, uterus and adnexa absent.  Anus and perineum normal. Bimanual and rectovaginal without masses or tenderness., negative findings: external genitalia normal, Bartholin's glands, urethra, Willington's glands negative, vaginal mucosa normal, cervix clear, adnexae negative    Lab:No results found for this or any previous visit (from the past 336 hour(s)).    Assessment:  Pelvic pain    Plan:pap smear  Discussed with patient that since she has no female pelvic organs there is no likelihood that she is having pain related to them.  We'll order a CT scan today to check for any other source of pathology in the abdomen and pelvis  Follow-up in 6 weeks

## 2017-05-11 ENCOUNTER — HOSPITAL ENCOUNTER (OUTPATIENT)
Dept: RADIOLOGY | Facility: MEDICAL CENTER | Age: 47
End: 2017-05-11
Attending: OBSTETRICS & GYNECOLOGY
Payer: COMMERCIAL

## 2017-05-11 DIAGNOSIS — R10.2 PELVIC PAIN: ICD-10-CM

## 2017-05-11 PROCEDURE — 700117 HCHG RX CONTRAST REV CODE 255: Performed by: OBSTETRICS & GYNECOLOGY

## 2017-05-11 PROCEDURE — 74177 CT ABD & PELVIS W/CONTRAST: CPT

## 2017-05-11 RX ADMIN — IOHEXOL 100 ML: 350 INJECTION, SOLUTION INTRAVENOUS at 13:28

## 2017-05-11 RX ADMIN — IOHEXOL 50 ML: 240 INJECTION, SOLUTION INTRATHECAL; INTRAVASCULAR; INTRAVENOUS; ORAL at 13:28

## 2017-05-16 ENCOUNTER — PATIENT MESSAGE (OUTPATIENT)
Dept: MEDICAL GROUP | Facility: PHYSICIAN GROUP | Age: 47
End: 2017-05-16

## 2017-05-16 ENCOUNTER — TELEPHONE (OUTPATIENT)
Dept: OBGYN | Facility: CLINIC | Age: 47
End: 2017-05-16

## 2017-05-18 ENCOUNTER — PATIENT MESSAGE (OUTPATIENT)
Dept: MEDICAL GROUP | Facility: PHYSICIAN GROUP | Age: 47
End: 2017-05-18

## 2017-06-27 ENCOUNTER — SLEEP CENTER VISIT (OUTPATIENT)
Dept: SLEEP MEDICINE | Facility: MEDICAL CENTER | Age: 47
End: 2017-06-27
Payer: COMMERCIAL

## 2017-06-27 VITALS
OXYGEN SATURATION: 94 % | HEIGHT: 67 IN | DIASTOLIC BLOOD PRESSURE: 86 MMHG | HEART RATE: 82 BPM | RESPIRATION RATE: 16 BRPM | SYSTOLIC BLOOD PRESSURE: 120 MMHG | WEIGHT: 200 LBS | BODY MASS INDEX: 31.39 KG/M2

## 2017-06-27 DIAGNOSIS — E66.9 OBESITY (BMI 30-39.9): ICD-10-CM

## 2017-06-27 DIAGNOSIS — F17.210 CIGARETTE SMOKER: ICD-10-CM

## 2017-06-27 DIAGNOSIS — G47.34 NOCTURNAL HYPOXEMIA: ICD-10-CM

## 2017-06-27 DIAGNOSIS — G47.33 OSA (OBSTRUCTIVE SLEEP APNEA): ICD-10-CM

## 2017-06-27 PROCEDURE — 99203 OFFICE O/P NEW LOW 30 MIN: CPT | Performed by: NURSE PRACTITIONER

## 2017-06-27 RX ORDER — ZOLPIDEM TARTRATE 5 MG/1
5 TABLET ORAL NIGHTLY PRN
Qty: 3 TAB | Refills: 0 | Status: SHIPPED | OUTPATIENT
Start: 2017-06-27 | End: 2017-10-17

## 2017-06-27 NOTE — PROGRESS NOTES
Chief Complaint   Patient presents with   • Establish Care     Patient is on nocturnal oxygen, prescribed at 2LPM, bumped it up to 4LPM on her own       HPI:  Daly Ambrosio is a 46 y.o. year old female here today to establish care. She was referred by her PCP, Dr. Jade Talley to evaluate for sleep apnea. She had an overnight oximetry 4/17/2017 which indicates evidence of clustered desaturations with a low 02 of 74%, mean 02 saturation of 85.7% with 252 minutes spent with saturations less than 88%. She was started on nocturnal 02 at 2 LPM. The patient did not feel this was adequate and she has since increased it to 4 LPM. She does snore loudly at night. She states she sleeps in a separate room due to her loud snoring. She does wake gasping for air. She has been told she has pauses in breathing during sleep. She denies trouble falling asleep. She feels she sleeps well at night. She states she sleeps on average 6 hours at night. She is waking un refreshed. She is tired during the day. She tends to nod off if sedentary. She notes rare morning headaches. She constantly feels she needs to move her legs, but feels this occurs throughout the day. She denies symptoms to suggest Narcolepsy. She states she had a sleep study years ago in [facility]. She states she was told she did not have significant sleep apnea. Her BMI is 31.32  She does have a history of Bipolar depression and Hypothyroidism.   Her mother and brother have sleep apnea and are on therapy.   She smokes on average 1/2 pack of cigarettes per day for the last 20 years. She states her PCP recently prescribed Chantix, but cost is an issue. She is working on [employer] back.       Past Medical History   Diagnosis Date   • Indigestion    • Snoring      has had sleep study   • Anesthesia      difficulty waking,  combative when waking   • Hiatus hernia syndrome    • Sleep apnea      no cpap   • thyroidectomy 2009     benign lymphocytic thyroiditis   •  Psychiatric problem      bi-polar   • Hypothyroidism, postsurgical    • Cigarette smoker        Past Surgical History   Procedure Laterality Date   • Debridement  2/11/2009     Performed by TAMIKO BRITT at SURGERY [facility]   • Bone graft  2/11/2009     Performed by TAMIKO BRITT at SURGERY [facility]   • Thyroidectomy total  6/3/2009     Performed by WYATT PEREZ at SURGERY SAME DAY [facility]   • Tonsillectomy  1987   • Pr removal of ovary/tube(s)  2007   • Knee arthroscopy  2/11/2009     Performed by TAMIKO BRITT at SURGERY [facility]   • Hardware removal ortho  2/11/2009     Performed by TAMIKO BRITT at SURGERY [facility]   • Acl repair  1994     right   • Acl repair  2002     right   • Knee arthroscopy  2002     right   • Knee arthroscopy  2006     left   • Knee arthroscopy  4/2008     left   • Acl reconstruction scope  8/19/2009     Performed by TAMIKO BRITT at SURGERY [facility]   • Primary c section  2004   • Hysterectomy laparoscopy  2007   • Acl reconstruction scope  10/9/2012     Performed by Lambert Franco M.D. at SURGERY [facility]   • Meniscectomy  10/9/2012     Performed by Lambert Franco M.D. at SURGERY [facility]   • Esophageal motility or manometry  5/10/2013     Performed by Junior Frank Jr., M.D. at ENDOSCOPY [facility]   • Lymph node excision  11/5/2014     Performed by Yasmany Polk M.D. at SURGERY SAME DAY [facility]       Family History   Problem Relation Age of Onset   • Cancer Mother    • Lung Disease Mother      COPD   • Heart Disease Mother    • Cancer Maternal Grandmother        Social History     Social History   • Marital Status:      Spouse Name: N/A   • Number of Children: N/A   • Years of Education: N/A     Occupational History   • Not on file.     Social History Main Topics   • Smoking status: Current Every Day Smoker -- 0.50 packs/day     Types: Cigarettes   • Smokeless tobacco: Never Used   • Alcohol  Use: 0.0 oz/week     0 Standard drinks or equivalent per week      Comment: 5 PER YR   • Drug Use: No   • Sexual Activity: Not on file     Other Topics Concern   • Not on file     Social History Narrative       ROS:  Constitutional: Denies fevers, chills, sweats, weight loss  Eyes: Denies glasses, vision loss, pain, drainage, double vision  Ears/Nose/Mouth/Throat: Denies rhinitis, nasal congestion, ear ache, difficulty hearing, sore throat, persistent hoarseness, decayed teeth/toothache  Cardiovascular: Denies chest pain, tightness, palpitations, swelling in feet/legs, fainting, difficulty breathing when laying down  Respiratory: Denies shortness of breath, cough, sputum, wheezing, painful breathing, coughing up blood  GI: Denies heartburn, difficulty swallowing, nausea, vomiting, abdominal pain, diarrhea, constipation  : Denies frequent urination, painful urination  Integumentary: Denies rashes, lumps or color changes  MSK: Denies painful joints, sore muscles, and back pain.   Neurological: Denies frequent headaches, dizziness, weakness  Sleep: See HPI       Current Outpatient Prescriptions on File Prior to Visit   Medication Sig Dispense Refill   • varenicline (CHANTIX STARTING MONTH NATASHA) 0.5 MG X 11 & 1 MG X 42 tablet As directed 56 Tab 3   • eletriptan (RELPAX) 40 MG tablet Take 1 Tab by mouth Once PRN for up to 1 dose. 10 Tab 5   • liothyronine (CYTOMEL) 25 MCG Tab Take 1 Tab by mouth every day. 90 Tab 3   • lamotrigine (LAMICTAL) 200 MG tablet Take 1 Tab by mouth every day. 90 Tab 3   • estradiol (ESTRACE) 2 MG Tab Take 1 Tab by mouth every day. 90 Tab 3   • SYNTHROID 200 MCG Tab Take 1 Tab by mouth every day. 90 Tab 3   • lorazepam (ATIVAN) 1 MG Tab TAKE 1/2-1 TABLET BY MOUTH 2 TIMES A DAY AS NEEDED 30 Tab 0     No current facility-administered medications on file prior to visit.     Codeine; Norco; Other misc; Penicillins; Tape; and Percocet    Blood pressure 120/86, pulse 82, resp. rate 16, height 1.702 m  "(5' 7\"), weight 90.719 kg (200 lb), SpO2 94 %.  PE:   Appearance: Well developed, well nourished, no acute distress  Eyes: PERRL, EOM intact, sclera white, conjunctiva moist  Ears: no lesions or deformities  Hearing: grossly intact  Nose: no lesions or deformities  Oropharynx: tongue normal, posterior pharynx without erythema or exudate  Mallampati Classification: class 4  Neck: supple, trachea midline, no masses   Respiratory effort: no intercostal retractions or use of accessory muscles  Lung auscultation: no rales, rhonchi or wheezes  Heart auscultation: no murmur rub or gallop  Extremities: no cyanosis or edema  Abdomen: soft ,non tender, no masses  Gait and Station: normal  Digits and nails: no clubbing, cyanosis, petechiae or nodes.  Cranial nerves: grossly intact  Skin: no rashes, lesions or ulcers noted  Orientation: Oriented to time, person and place  Mood and affect: mood and affect appropriate, normal interaction with examiner  Judgement: Intact          Assessment:  1. JESSICA (obstructive sleep apnea)      Provisional diagnosis   2. Nocturnal hypoxemia     3. Obesity (BMI 30-39.9)     4. Cigarette smoker           Plan:    1) Polysomnogram now to evaluate for sleep apnea. Pathophysiology of sleep apnea discussed in detail including various treatment options. RX for Ambien provided to have on hand the night of the study.  2) Sleep hygiene discussed. Weight loss recommended.  3) Smoking cessation encouraged. She is working on cutting back.   4) Follow up after Polysomnogram, sooner if needed. In the interim she will continue with nocturnal 02.   "

## 2017-06-27 NOTE — PATIENT INSTRUCTIONS
Plan:    1) Polysomnogram now to evaluate for sleep apnea. Pathophysiology of sleep apnea discussed in detail including various treatment options. RX for Ambien provided to have on hand the night of the study.  2) Sleep hygiene discussed. Weight loss recommended.  3) Smoking cessation encouraged. She is working on cutting back.   4) Follow up after Polysomnogram, sooner if needed. In the interim she will continue with nocturnal 02.

## 2017-06-27 NOTE — MR AVS SNAPSHOT
"        Daly Onealtaker   2017 1:20 PM   Sleep Center Visit   MRN: [id number]    Department:  [facility]   Dept Phone:  [phone]    Description:  Female : 1970   Provider:  PERLA Gibbs           Reason for Visit     Establish Care Patient is on nocturnal oxygen, prescribed at 2LPM, bumped it up to 4LPM on her own      Allergies as of 2017     Allergen Noted Reactions    Codeine 2009   Vomiting    Norco [Hydrocodone-Acetaminophen] 2012   Vomiting    Other Misc 2016       adhesive    Penicillins 2009   Rash    Tape 2009       Causes blisters - can't do regular or paper tape - only coban    Percocet [Oxycodone-Acetaminophen] 2009   Itching      You were diagnosed with     JESSICA (obstructive sleep apnea)   [048595]   Provisional diagnosis    Nocturnal hypoxemia   [743663]       Obesity (BMI 30-39.9)   [159840]       Cigarette smoker   [348683]         Vital Signs     Blood Pressure Pulse Respirations Height Weight Body Mass Index    120/86 mmHg 82 16 1.702 m (5' 7\") 90.719 kg (200 lb) 31.32 kg/m2    Oxygen Saturation Smoking Status                94% Current Every Day Smoker          Basic Information     Date Of Birth Sex Race Ethnicity Preferred Language    1970 Female White Non- English      Your appointments     Aug 24, 2017  9:00 PM   Sleep Study Diagnostic with SLEEP TECH   [facility] (--)    [address]  [place] [id number] [phone]            Sep 05, 2017 10:00 AM   Follow UP with [email or web address] [email or web address]   [facility] (--)    [address]  [facility] [address]  [place] [address]   [phone]              Problem List              ICD-10-CM Priority Class Noted - Resolved    ACL (anterior cruciate ligament) rupture S83.519A   10/9/2012 - Present    Insomnia G47.00   2012 - Present    Carpal tunnel syndrome G56.00   2012 - " Present    Mood disorder (CMS-[facility]) F39   11/27/2012 - Present    Dysphagia    4/9/2013 - Present    Dyslipidemia E78.5   4/9/2013 - Present    Swallowing difficulty R13.10   5/10/2013 - Present    Sinusitis J32.9   9/26/2013 - Present    Enlarged lymph nodes R59.9   11/5/2014 - Present    Hypothyroidism, postsurgical E89.0   Unknown - Present    Cigarette smoker F17.210   Unknown - Present    Polyp, vagina N84.2   6/11/2015 - Present    Perimenopausal vasomotor symptoms N95.1   6/7/2016 - Present    Obesity (BMI 30-39.9) E66.9   3/28/2017 - Present      Health Maintenance        Date Due Completion Dates    IMM DTaP/Tdap/Td Vaccine (1 - Tdap) 11/30/1989 ---    IMM PNEUMOCOCCAL 19-64 (ADULT) MEDIUM RISK SERIES (1 of 1 - PPSV23) 11/30/1989 ---    MAMMOGRAM 10/31/2017 10/31/2016, 10/20/2015, 9/5/2014, 5/22/2013    PAP SMEAR 5/10/2020 5/10/2017, 1/24/2014            Current Immunizations     No immunizations on file.      Below and/or attached are the medications your provider expects you to take. Review all of your home medications and newly ordered medications with your provider and/or pharmacist. Follow medication instructions as directed by your provider and/or pharmacist. Please keep your medication list with you and share with your provider. Update the information when medications are discontinued, doses are changed, or new medications (including over-the-counter products) are added; and carry medication information at all times in the event of emergency situations     Allergies:  CODEINE - Vomiting     NORCO - Vomiting     OTHER MISC - (reactions not documented)     PENICILLINS - Rash     TAPE - (reactions not documented)     PERCOCET - Itching               Medications  Valid as of: June 27, 2017 -  1:55 PM    Generic Name Brand Name Tablet Size Instructions for use    Eletriptan Hydrobromide (Tab) RELPAX 40 MG Take 1 Tab by mouth Once PRN for up to 1 dose.        Estradiol (Tab) ESTRACE 2 MG Take 1 Tab by mouth  every day.        LamoTRIgine (Tab) LAMICTAL 200 MG Take 1 Tab by mouth every day.        Levothyroxine Sodium (Tab) SYNTHROID 200 MCG Take 1 Tab by mouth every day.        Liothyronine Sodium (Tab) CYTOMEL 25 MCG Take 1 Tab by mouth every day.        LORazepam (Tab) ATIVAN 1 MG TAKE 1/2-1 TABLET BY MOUTH 2 TIMES A DAY AS NEEDED        Varenicline Tartrate (Misc) CHANTIX NATASHA 0.5 MG X 11 & 1 MG X 42 As directed        Zolpidem Tartrate (Tab) AMBIEN 5 MG Take 1 Tab by mouth at bedtime as needed for Sleep.        .                 Medicines prescribed today were sent to:     [facility]/PHARMACY #[id number] - [place], [place] - [address]    [address] [place] [address]    Phone: [phone] Fax: [phone]    Open 24 Hours?: No      Medication refill instructions:       If your prescription bottle indicates you have medication refills left, it is not necessary to call your provider’s office. Please contact your pharmacy and they will refill your medication.    If your prescription bottle indicates you do not have any refills left, you may request refills at any time through one of the following ways: The online [employer] system (except Urgent Care), by calling your provider’s office, or by asking your pharmacy to contact your provider’s office with a refill request. Medication refills are processed only during regular business hours and may not be available until the next business day. Your provider may request additional information or to have a follow-up visit with you prior to refilling your medication.   *Please Note: Medication refills are assigned a new Rx number when refilled electronically. Your pharmacy may indicate that no refills were authorized even though a new prescription for the same medication is available at the pharmacy. Please request the medicine by name with the pharmacy before contacting your provider for a refill.           [employer] Access Code: Activation code not generated  Current [employer] Status:  Active          Quit Tobacco Information     Do you want to quit using tobacco?    Quitting tobacco decreases risks of cancer, heart and lung disease, increases life expectancy, improves sense of taste and smell, and increases spending money, among other benefits.    If you are thinking about quitting, we can help.  • [facility] Quit Tobacco Program: [phone]  o Program occurs weekly for four weeks and includes pharmacist consultation on products to support quitting smoking or chewing tobacco. A provider referral is needed for pharmacist consultation.  • Tobacco Users Help Hotline: 1-972-QUIT-NOW (123-2643) or https://[place].quitlogix.org/  o Free, confidential telephone and online coaching for [place] residents. Sessions are designed on a schedule that is convenient for you. Eligible clients receive free nicotine replacement therapy.  • Nationally: www.smokefree.gov  o Information and professional assistance to support both immediate and long-term needs as you become, and remain, a non-smoker. Smokefree.gov allows you to choose the help that best fits your needs.

## 2017-07-11 ENCOUNTER — RX ONLY (OUTPATIENT)
Age: 47
Setting detail: RX ONLY
End: 2017-07-11

## 2017-07-18 RX ORDER — LEVOTHYROXINE SODIUM 200 MCG
TABLET ORAL
Qty: 90 TAB | Refills: 2 | Status: SHIPPED | OUTPATIENT
Start: 2017-07-18 | End: 2018-10-23 | Stop reason: SDUPTHER

## 2017-07-18 RX ORDER — LAMOTRIGINE 200 MG/1
TABLET ORAL
Qty: 90 TAB | Refills: 2 | Status: SHIPPED | OUTPATIENT
Start: 2017-07-18 | End: 2018-10-23 | Stop reason: SDUPTHER

## 2017-07-18 RX ORDER — ESTRADIOL 2 MG/1
TABLET ORAL
Qty: 90 TAB | Refills: 1 | Status: SHIPPED | OUTPATIENT
Start: 2017-07-18 | End: 2018-10-23 | Stop reason: SDUPTHER

## 2017-07-18 RX ORDER — LIOTHYRONINE SODIUM 25 UG/1
TABLET ORAL
Qty: 90 TAB | Refills: 2 | Status: SHIPPED | OUTPATIENT
Start: 2017-07-18 | End: 2018-10-23 | Stop reason: SDUPTHER

## 2017-08-21 RX ORDER — LIOTHYRONINE SODIUM 25 UG/1
TABLET ORAL
Refills: 2 | OUTPATIENT
Start: 2017-08-21

## 2017-08-21 RX ORDER — ESTRADIOL 2 MG/1
TABLET ORAL
Refills: 1 | OUTPATIENT
Start: 2017-08-21

## 2017-08-24 ENCOUNTER — SLEEP STUDY (OUTPATIENT)
Dept: SLEEP MEDICINE | Facility: MEDICAL CENTER | Age: 47
End: 2017-08-24
Attending: NURSE PRACTITIONER
Payer: COMMERCIAL

## 2017-08-24 NOTE — MR AVS SNAPSHOT
Daly Ambrosio   2017 9:00 PM   Sleep Study   MRN: [id number]    Department:  [facility]   Dept Phone:  [phone]    Description:  Female : 1970   Provider:  SLEEP TECH           Allergies as of 2017     Allergen Noted Reactions    Codeine 2009   Vomiting    Norco [Hydrocodone-Acetaminophen] 2012   Vomiting    Other Misc 2016       adhesive    Penicillins 2009   Rash    Tape 2009       Causes blisters - can't do regular or paper tape - only coban    Percocet [Oxycodone-Acetaminophen] 2009   Itching      Vital Signs     Smoking Status                   Current Every Day Smoker           Basic Information     Date Of Birth Sex Race Ethnicity Preferred Language    1970 Female White Non- English      Your appointments     Sep 05, 2017 10:00 AM   Follow UP with PERLA Gibbs   [facility] (--)    [address]  [place] [id number] [phone]              Problem List              ICD-10-CM Priority Class Noted - Resolved    ACL (anterior cruciate ligament) rupture S83.519A   10/9/2012 - Present    Insomnia G47.00   2012 - Present    Carpal tunnel syndrome G56.00   2012 - Present    Mood disorder (CMS-[facility]) F39   2012 - Present    Dysphagia    2013 - Present    Dyslipidemia E78.5   2013 - Present    Swallowing difficulty R13.10   5/10/2013 - Present    Sinusitis J32.9   2013 - Present    Enlarged lymph nodes R59.9   2014 - Present    Hypothyroidism, postsurgical E89.0   Unknown - Present    Cigarette smoker F17.210   Unknown - Present    Polyp, vagina N84.2   2015 - Present    Perimenopausal vasomotor symptoms N95.1   2016 - Present    Obesity (BMI 30-39.9) E66.9   3/28/2017 - Present      Health Maintenance        Date Due Completion Dates    IMM DTaP/Tdap/Td Vaccine (1 - Tdap) 1989 ---    IMM PNEUMOCOCCAL 19-64  (ADULT) MEDIUM RISK SERIES (1 of 1 - PPSV23) 11/30/1989 ---    IMM INFLUENZA (1) 9/1/2017 ---    MAMMOGRAM 10/31/2017 10/31/2016, 10/20/2015, 9/5/2014, 5/22/2013    PAP SMEAR 5/10/2020 5/10/2017, 1/24/2014            Current Immunizations     No immunizations on file.      Below and/or attached are the medications your provider expects you to take. Review all of your home medications and newly ordered medications with your provider and/or pharmacist. Follow medication instructions as directed by your provider and/or pharmacist. Please keep your medication list with you and share with your provider. Update the information when medications are discontinued, doses are changed, or new medications (including over-the-counter products) are added; and carry medication information at all times in the event of emergency situations     Allergies:  CODEINE - Vomiting     NORCO - Vomiting     OTHER MISC - (reactions not documented)     PENICILLINS - Rash     TAPE - (reactions not documented)     PERCOCET - Itching               Medications  Valid as of: August 25, 2017 -  4:18 AM    Generic Name Brand Name Tablet Size Instructions for use    Eletriptan Hydrobromide (Tab) RELPAX 40 MG Take 1 Tab by mouth Once PRN for up to 1 dose.        Estradiol (Tab) ESTRACE 2 MG TAKE 1 TAB BY MOUTH EVERY DAY.        LamoTRIgine (Tab) LAMICTAL 200 MG TAKE 1 TAB BY MOUTH EVERY DAY.        Levothyroxine Sodium (Tab) SYNTHROID 200 MCG TAKE 1 TAB BY MOUTH EVERY DAY.        Liothyronine Sodium (Tab) CYTOMEL 25 MCG TAKE 1 TAB BY MOUTH EVERY DAY.        LORazepam (Tab) ATIVAN 1 MG TAKE 1/2-1 TABLET BY MOUTH 2 TIMES A DAY AS NEEDED        Varenicline Tartrate (Misc) CHANTIX NATASHA 0.5 MG X 11 & 1 MG X 42 As directed        Zolpidem Tartrate (Tab) AMBIEN 5 MG Take 1 Tab by mouth at bedtime as needed for Sleep.        .                 Medicines prescribed today were sent to:     [facility]/PHARMACY #[id number] - [place], [place] - [address]    [address]  [place] [address]    Phone: [phone] Fax: [phone]    Open 24 Hours?: No      Medication refill instructions:       If your prescription bottle indicates you have medication refills left, it is not necessary to call your provider’s office. Please contact your pharmacy and they will refill your medication.    If your prescription bottle indicates you do not have any refills left, you may request refills at any time through one of the following ways: The online [employer] system (except Urgent Care), by calling your provider’s office, or by asking your pharmacy to contact your provider’s office with a refill request. Medication refills are processed only during regular business hours and may not be available until the next business day. Your provider may request additional information or to have a follow-up visit with you prior to refilling your medication.   *Please Note: Medication refills are assigned a new Rx number when refilled electronically. Your pharmacy may indicate that no refills were authorized even though a new prescription for the same medication is available at the pharmacy. Please request the medicine by name with the pharmacy before contacting your provider for a refill.           [employer] Access Code: Activation code not generated  Current [employer] Status: Active          Quit Tobacco Information     Do you want to quit using tobacco?    Quitting tobacco decreases risks of cancer, heart and lung disease, increases life expectancy, improves sense of taste and smell, and increases spending money, among other benefits.    If you are thinking about quitting, we can help.  • [facility] Quit Tobacco Program: [phone]  o Program occurs weekly for four weeks and includes pharmacist consultation on products to support quitting smoking or chewing tobacco. A provider referral is needed for pharmacist consultation.  • Tobacco Users Help Hotline: 7-407-QUIT-NOW (451-4461) or https://[place].quitlogix.org/  o Free,  confidential telephone and online coaching for [place] residents. Sessions are designed on a schedule that is convenient for you. Eligible clients receive free nicotine replacement therapy.  • Nationally: www.smokefree.gov  o Information and professional assistance to support both immediate and long-term needs as you become, and remain, a non-smoker. Smokefree.gov allows you to choose the help that best fits your needs.

## 2017-08-25 NOTE — PROCEDURES
CLINICAL COMMENTS:  The patient underwent a split night polysomnogram with a CPAP titration using the standard montage for measurement of parameters of sleep, respiratory events, movement abnormalities, heart rate and rhythm. A microphone was used to monitor snoring.    INTERPRETATION: The diagnostic recording time was 167.5 minutes with a sleep period of 165.8 minutes.  Total sleep time was 161.0 minutes with a sleep efficiency of 96.1%.  The sleep latency was 1.7 minutes, and REM latency was 57.0 minutes.  The patient had 24 arousals in total, for an arousal index of 8.9.        RESPIRATORY: The patient had 17 apneas in total.  Of these, 16 were obstructive apneas, and 1 were central apneas.  This resulted in an apnea index (AI) of 6.3.  The patient had 31 hypopneas in total, which resulted in a hypopnea index of 11.6.  The overall AHI was 17.9, while the AHI during REM was 36.0.  The supine AHI = 27.0.    OXIMETRY: Oxygen saturation monitoring showed a mean SpO2 of 87.8% for the diagnostic part of the study, with a minimum oxygen saturation of 82.0%.  Oxygen saturations were below 89% for 125.1% of sleep time.    CARDIAC: The highest heart rate for the first part of the study was 86.0 beats per minute.  The average heart rate during sleep was 72.2 bpm, while the highest heart rate was 83.0 bpm.    LIMB MOVEMENTS: There were a total of 0 periodic limb movements during sleep, of which 0 were PLMS arousals.  This resulted in a PLMS index of 0.0 and a PLMS arousal index of 0.0.    TREATMENT    Treatment recording time was 193.0 minutes with a total sleep time of 177.0min.  The patient had an arousal index of 6.8.      RESPIRATORY: The patient had 2 obstructive apneas, 2 central apneas, and 32 hypopneas for an overall AHI was 12.2.    OXIMETRY: The mean SpO2 during treatment was 88.5%, with a minimum oxygen saturation of 81.0%.      CPAP was tried from 5 to 10 cmH2O.    The baseline component of the sleep study sleep  efficiency was 96%. Sleep architecture showed stage 1:6%, stage 2:89%, stage III: 2%, REM sleep: 3% of the night. Sleep latency was reduced at 1.7 minutes. No EKG or EMG abnormalities were noted.    Baseline oxygen saturations were averaging 87% percent. Upon falling asleep, obstructive respiratory apneas were noted with overall AHI of 18 events per hour. There were desaturations below 90% for 97.5% of the night, to a alonzo of 82%.    After meeting split-night criteria CPAP therapy was started from 5 cm of water, and titrated to 10 cm of water. CPAP at 9 cm of water reduced the AHI to one, with mean oximetry maintained at 88%.    Interpretation:   Moderately severe obstructive sleep apnea syndrome with AHI 18 events per hour.  Baseline hypoxemia.  Successful CPAP titration at 9 cm of water with resultant AHI of one, however persisted hypoxia.    Recommendations:  Treatment options for sleep apnea include CPAP therapy, UPPP or a dental appliance. The patient showed good response to CPAP therapy.  CPAP: 9 cm of water is recommended with oxygen bleed in at 2 L/m.  Cardiopulmonary workup advise secondary to baseline hypoxia.  Weight loss recommended secondary to BMI of 31.  Avoidance of smoking, alcohol, sedatives, and muscle relaxants, as these can exacerbate sleep disordered breathing.

## 2017-08-29 ENCOUNTER — SLEEP CENTER VISIT (OUTPATIENT)
Dept: SLEEP MEDICINE | Facility: MEDICAL CENTER | Age: 47
End: 2017-08-29
Payer: COMMERCIAL

## 2017-08-29 VITALS
WEIGHT: 202.2 LBS | DIASTOLIC BLOOD PRESSURE: 72 MMHG | OXYGEN SATURATION: 95 % | HEIGHT: 67 IN | BODY MASS INDEX: 31.74 KG/M2 | RESPIRATION RATE: 16 BRPM | HEART RATE: 77 BPM | SYSTOLIC BLOOD PRESSURE: 114 MMHG

## 2017-08-29 DIAGNOSIS — F17.210 CIGARETTE SMOKER: ICD-10-CM

## 2017-08-29 DIAGNOSIS — E66.9 OBESITY (BMI 30-39.9): ICD-10-CM

## 2017-08-29 DIAGNOSIS — G47.33 OSA (OBSTRUCTIVE SLEEP APNEA): ICD-10-CM

## 2017-08-29 DIAGNOSIS — J30.9 ALLERGIC RHINITIS, UNSPECIFIED ALLERGIC RHINITIS TRIGGER, UNSPECIFIED RHINITIS SEASONALITY: ICD-10-CM

## 2017-08-29 DIAGNOSIS — R06.09 DYSPNEA ON EXERTION: ICD-10-CM

## 2017-08-29 PROCEDURE — 99214 OFFICE O/P EST MOD 30 MIN: CPT | Performed by: NURSE PRACTITIONER

## 2017-08-29 NOTE — PROGRESS NOTES
Chief Complaint   Patient presents with   • Apnea     Follow-up after sleep study       HPI:  Daly Ambrosio is a 46 y.o. year old female here today for follow-up on her Polysomnogram. She was referred by her PCP, Dr. Jade Tallye to evaluate for sleep apnea. She had an overnight oximetry 4/17/2017 which indicates evidence of clustered desaturations with a low 02 of 74%, mean 02 saturation of 85.7% with 252 minutes spent with saturations less than 88%. She was started on nocturnal 02 at 2 LPM. The patient did not feel this was adequate and she increased it to 4 LPM. She does snore loudly at night. She states she sleeps in a separate room due to her loud snoring. She does wake gasping for air. She has been told she has pauses in breathing during sleep. She denies trouble falling asleep. She feels she sleeps well at night. She states she sleeps on average 6 hours at night. She is waking un refreshed. She is tired during the day. She tends to nod off if sedentary. She notes rare morning headaches. She constantly feels she needs to move her legs, but feels this occurs throughout the day. She denies symptoms to suggest Narcolepsy. She states she had a sleep study years ago in [facility]. She states she was told she did not have significant sleep apnea. Her BMI is 31.32  She does have a history of Bipolar depression and Hypothyroidism.   Her mother and brother have sleep apnea and are on therapy.   She underwent a Polysomnogram on 8/24/2017. PSG indicates an AHI of 17.9 with a REM index of 36 and a low 02 saturation of 82%. She was split to CPAP pressures 5-10 CM H20. She did best on a CPAP pressure of 9 CM H20 with a resultant AHI of 1 and a mean 02 saturation of 88.8%. She will require 02 bleed in a 2 LPM.   She smokes on average 1/2 pack of cigarettes per day for the last 20 years. She states her PCP recently prescribed Chantix, but cost is an issue. 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   She does have dogs  in the home. She feels she may be allergic to the dogs. She notes runny nose, sneezing and itchy watery eyes. She notes mild dyspnea with exertion which she feels is related to her increased weight. She notes an occasional cough with clear mucous. She denies wheezing. She denies fevers or chills.       Past Medical History:   Diagnosis Date   • thyroidectomy 2009    benign lymphocytic thyroiditis   • Anesthesia     difficulty waking,  combative when waking   • Cigarette smoker    • Hiatus hernia syndrome    • Hypothyroidism, postsurgical    • Indigestion    • Psychiatric problem     bi-polar   • Sleep apnea     no cpap   • Snoring     has had sleep study       Past Surgical History:   Procedure Laterality Date   • LYMPH NODE EXCISION  11/5/2014    Performed by Yasmany Polk M.D. at SURGERY SAME DAY [facility]   • ESOPHAGEAL MOTILITY OR MANOMETRY  5/10/2013    Performed by Junior Frank Jr., M.D. at ENDOSCOPY [facility]   • ACL RECONSTRUCTION SCOPE  10/9/2012    Performed by Lambert Franco M.D. at SURGERY [facility]   • MENISCECTOMY  10/9/2012    Performed by Lambert Franco M.D. at SURGERY [facility]   • ACL RECONSTRUCTION SCOPE  8/19/2009    Performed by TAMIKO BRITT at SURGERY [facility]   • THYROIDECTOMY TOTAL  6/3/2009    Performed by WYATT PEREZ at SURGERY SAME DAY [facility]S   • DEBRIDEMENT  2/11/2009    Performed by TAMIKO BRITT at SURGERY [facility]   • BONE GRAFT  2/11/2009    Performed by TAMIKO BRITT at SURGERY [facility] ORS   • KNEE ARTHROSCOPY  2/11/2009    Performed by TAMIKO BRITT at SURGERY [facility] ORS   • HARDWARE REMOVAL ORTHO  2/11/2009    Performed by TAMIKO BRITT at SURGERY [facility] ORS   • KNEE ARTHROSCOPY  4/2008    left   • PB REMOVAL OF OVARY/TUBE(S)  2007   • HYSTERECTOMY LAPAROSCOPY  2007   • KNEE ARTHROSCOPY  2006    left   • PRIMARY C SECTION  2004   • ACL REPAIR  2002    right   • KNEE ARTHROSCOPY  2002    right   • ACL  REPAIR  1994    right   • TONSILLECTOMY  1987       Family History   Problem Relation Age of Onset   • Cancer Mother    • Lung Disease Mother      COPD   • Heart Disease Mother    • Cancer Maternal Grandmother        Social History     Social History   • Marital status:      Spouse name: N/A   • Number of children: N/A   • Years of education: N/A     Occupational History   • Not on file.     Social History Main Topics   • Smoking status: Current Every Day Smoker     Packs/day: 0.50     Types: Cigarettes   • Smokeless tobacco: Never Used   • Alcohol use 0.0 - 1.2 oz/week      Comment: 5 PER YR   • Drug use: No   • Sexual activity: Not on file     Other Topics Concern   • Not on file     Social History Narrative   • No narrative on file         ROS:  Constitutional: Denies fevers, chills, sweats, fatigue, weight loss  Eyes: Denies glasses, vision loss, pain, drainage, double vision  Ears/Nose/Mouth/Throat: Denies ear ache, difficulty hearing, sore throat, persistent hoarseness, decayed teeth/toothache. Positive rhinitis   Cardiovascular: Denies chest pain, tightness, palpitations, swelling in feet/legs, fainting, difficulty breathing when laying down  Respiratory: See HPI   GI: Denies heartburn, difficulty swallowing, nausea, vomiting, abdominal pain, diarrhea, constipation  : Denies frequent urination, painful urination  Integumentary: Denies rashes, lumps or color changes  MSK: Denies painful joints, sore muscles, and back pain.   Neurological: Denies frequent headaches, dizziness, weakness  Sleep: See HPI       Current Outpatient Prescriptions on File Prior to Visit   Medication Sig Dispense Refill   • liothyronine (CYTOMEL) 25 MCG Tab TAKE 1 TAB BY MOUTH EVERY DAY. 90 Tab 2   • estradiol (ESTRACE) 2 MG Tab TAKE 1 TAB BY MOUTH EVERY DAY. 90 Tab 1   • SYNTHROID 200 MCG Tab TAKE 1 TAB BY MOUTH EVERY DAY. 90 Tab 2   • lamotrigine (LAMICTAL) 200 MG tablet TAKE 1 TAB BY MOUTH EVERY DAY. 90 Tab 2   • zolpidem  "(AMBIEN) 5 MG Tab Take 1 Tab by mouth at bedtime as needed for Sleep. 3 Tab 0   • varenicline (CHANTIX STARTING MONTH PAK) 0.5 MG X 11 & 1 MG X 42 tablet As directed 56 Tab 3   • eletriptan (RELPAX) 40 MG tablet Take 1 Tab by mouth Once PRN for up to 1 dose. 10 Tab 5   • lorazepam (ATIVAN) 1 MG Tab TAKE 1/2-1 TABLET BY MOUTH 2 TIMES A DAY AS NEEDED 30 Tab 0     No current facility-administered medications on file prior to visit.      Codeine; Norco [hydrocodone-acetaminophen]; Other misc; Penicillins; Tape; and Percocet [oxycodone-acetaminophen]    Blood pressure 114/72, pulse 77, resp. rate 16, height 1.702 m (5' 7\"), weight 91.7 kg (202 lb 3.2 oz), SpO2 95 %.  PE:   Appearance: Well developed, well nourished, no acute distress  Eyes: PERRL, EOM intact, sclera white, conjunctiva moist  Ears: no lesions or deformities  Hearing: grossly intact  Nose: no lesions or deformities  Oropharynx: tongue normal, posterior pharynx without erythema or exudate  Mallampati Classification: class 4  Neck: supple, trachea midline, no masses   Respiratory effort: no intercostal retractions or use of accessory muscles  Lung auscultation: no rales, rhonchi or wheezes  Heart auscultation: no murmur rub or gallop  Extremities: no cyanosis or edema  Abdomen: soft ,non tender, no masses  Gait and Station: normal  Digits and nails: no clubbing, cyanosis, petechiae or nodes.  Cranial nerves: grossly intact  Skin: no rashes, lesions or ulcers noted  Orientation: Oriented to time, person and place  Mood and affect: mood and affect appropriate, normal interaction with examiner  Judgement: Intact          Assessment:  1. JESSICA (obstructive sleep apnea)  DME CPAP   2. Obesity (BMI 30-39.9)     3. Cigarette smoker     4. Dyspnea on exertion  AMB PULMONARY FUNCTION TEST/LAB    DX-CHEST-2 VIEWS   5. Allergic rhinitis, unspecified allergic rhinitis trigger, unspecified rhinitis seasonality  ALLERGENS ZONE 15    EOSINOPHIL COUNT    IGE SERUM "         Plan:      1) Reviewed sleep study in detail. Discussed pathophysiology of sleep apnea and various treatment options in detail. She is amendable to a trial of airway pressurization Order for CPAP at 9 CM H20 with 3 LPM 02 bleed in.   2) Sleep hygiene discussed.   3) Weight loss recommended.   4) IgE and Adult respiratory panel for further work up on her Allergies. Recommend trial of Flonase OTC 1-2 sprays/nostril qhs.   5) Smoking cessation discussed and recommended.  6) PFT's and a chest xray for further work up on her dyspnea and her persistent nocturnal desaturations.   7) Follow up in 6 weeks with compliance card download, PFT's and a chest xray, sooner if needed.

## 2017-08-29 NOTE — PATIENT INSTRUCTIONS
Plan:      1) Reviewed sleep study in detail. Discussed pathophysiology of sleep apnea and various treatment options in detail. She is amendable to a trial of airway pressurization Order for CPAP at 9 CM H20 with 3 LPM 02 bleed in.   2) Sleep hygiene discussed.   3) Weight loss recommended.   4) IgE and Adult respiratory panel for further work up on her Allergies. Recommend trial of Flonase OTC 1-2 sprays/nostril qhs.   5) Smoking cessation discussed and recommended.  6) PFT's and a chest xray for further work up on her dyspnea and her persistent nocturnal desaturations.   7) Follow up in 6 weeks with compliance card download, PFT's and a chest xray, sooner if needed.

## 2017-09-05 ENCOUNTER — TELEPHONE (OUTPATIENT)
Dept: SLEEP MEDICINE | Facility: MEDICAL CENTER | Age: 47
End: 2017-09-05

## 2017-09-05 NOTE — TELEPHONE ENCOUNTER
Patient called [employer] and they have not received the order for equipment yet.    Refaxed order with all supporting documentation.

## 2017-09-06 ENCOUNTER — PATIENT MESSAGE (OUTPATIENT)
Dept: MEDICAL GROUP | Facility: PHYSICIAN GROUP | Age: 47
End: 2017-09-06

## 2017-09-07 ENCOUNTER — HOSPITAL ENCOUNTER (OUTPATIENT)
Dept: RADIOLOGY | Facility: MEDICAL CENTER | Age: 47
End: 2017-09-07
Attending: NURSE PRACTITIONER
Payer: COMMERCIAL

## 2017-09-07 ENCOUNTER — HOSPITAL ENCOUNTER (OUTPATIENT)
Dept: LAB | Facility: MEDICAL CENTER | Age: 47
End: 2017-09-07
Attending: NURSE PRACTITIONER
Payer: COMMERCIAL

## 2017-09-07 DIAGNOSIS — R06.09 DYSPNEA ON EXERTION: ICD-10-CM

## 2017-09-07 LAB — EOSINOPHIL # BLD AUTO: 0.24 K/UL (ref 0–0.51)

## 2017-09-07 PROCEDURE — 36415 COLL VENOUS BLD VENIPUNCTURE: CPT

## 2017-09-07 PROCEDURE — 71020 DX-CHEST-2 VIEWS: CPT

## 2017-09-07 PROCEDURE — 86003 ALLG SPEC IGE CRUDE XTRC EA: CPT | Mod: 91

## 2017-09-07 PROCEDURE — 85004 AUTOMATED DIFF WBC COUNT: CPT

## 2017-09-07 PROCEDURE — 82785 ASSAY OF IGE: CPT

## 2017-09-10 LAB
A ALTERNATA IGE QN: <0.1 KU/L
A FUMIGATUS IGE QN: <0.1 KU/L
BERMUDA GRASS IGE QN: <0.1 KU/L
BOXELDER IGE QN: <0.1 KU/L
C SPHAEROSPERMUM IGE QN: <0.1 KU/L
CAT DANDER IGE QN: <0.1 KU/L
CMN PIGWEED IGE QN: <0.1 KU/L
COMMON RAGWEED IGE QN: <0.1 KU/L
COTTONWOOD IGE QN: <0.1 KU/L
COW MILK IGE QN: <0.1 KU/L
D FARINAE IGE QN: 0.15 KU/L
D PTERONYSS IGE QN: 0.21 KU/L
DEPRECATED MISC ALLERGEN IGE RAST QL: NORMAL
DOG DANDER IGE QN: <0.1 KU/L
IGE SERPL-ACNC: 17 KU/L
M RACEMOSUS IGE QN: <0.1 KU/L
MOUSE EPITH IGE QN: <0.1 KU/L
MT JUNIPER IGE QN: <0.1 KU/L
MUGWORT IGE QN: <0.1 KU/L
OLIVE POLN IGE QN: <0.1 KU/L
P NOTATUM IGE QN: <0.1 KU/L
PEANUT IGE QN: <0.1 KU/L
ROACH IGE QN: 0.12 KU/L
SALTWORT IGE QN: <0.1 KU/L
TIMOTHY IGE QN: <0.1 KU/L
WHITE ELM IGE QN: <0.1 KU/L
WHITE MULBERRY IGE QN: <0.1 KU/L
WHITE OAK IGE QN: <0.1 KU/L

## 2017-10-11 ENCOUNTER — NON-PROVIDER VISIT (OUTPATIENT)
Dept: PULMONOLOGY | Facility: HOSPICE | Age: 47
End: 2017-10-11
Payer: COMMERCIAL

## 2017-10-11 ENCOUNTER — OFFICE VISIT (OUTPATIENT)
Dept: PULMONOLOGY | Facility: HOSPICE | Age: 47
End: 2017-10-11
Payer: COMMERCIAL

## 2017-10-11 VITALS
DIASTOLIC BLOOD PRESSURE: 64 MMHG | TEMPERATURE: 97.9 F | SYSTOLIC BLOOD PRESSURE: 110 MMHG | HEIGHT: 66 IN | OXYGEN SATURATION: 96 % | BODY MASS INDEX: 32.14 KG/M2 | WEIGHT: 200 LBS | RESPIRATION RATE: 16 BRPM | HEART RATE: 73 BPM

## 2017-10-11 VITALS — BODY MASS INDEX: 32.14 KG/M2 | HEIGHT: 66 IN | WEIGHT: 200 LBS

## 2017-10-11 DIAGNOSIS — R07.9 CHEST PAIN, UNSPECIFIED TYPE: ICD-10-CM

## 2017-10-11 DIAGNOSIS — G47.33 OSA (OBSTRUCTIVE SLEEP APNEA): ICD-10-CM

## 2017-10-11 DIAGNOSIS — R06.09 DYSPNEA ON EXERTION: ICD-10-CM

## 2017-10-11 DIAGNOSIS — F17.210 CIGARETTE SMOKER: ICD-10-CM

## 2017-10-11 DIAGNOSIS — J30.9 ALLERGIC RHINITIS, UNSPECIFIED CHRONICITY, UNSPECIFIED SEASONALITY, UNSPECIFIED TRIGGER: ICD-10-CM

## 2017-10-11 DIAGNOSIS — E66.9 OBESITY (BMI 30-39.9): ICD-10-CM

## 2017-10-11 PROCEDURE — 99214 OFFICE O/P EST MOD 30 MIN: CPT | Performed by: NURSE PRACTITIONER

## 2017-10-11 PROCEDURE — 94060 EVALUATION OF WHEEZING: CPT | Performed by: INTERNAL MEDICINE

## 2017-10-11 PROCEDURE — 94729 DIFFUSING CAPACITY: CPT | Performed by: INTERNAL MEDICINE

## 2017-10-11 PROCEDURE — 94726 PLETHYSMOGRAPHY LUNG VOLUMES: CPT | Performed by: INTERNAL MEDICINE

## 2017-10-11 ASSESSMENT — PULMONARY FUNCTION TESTS
FVC_PREDICTED: 3.78
FEV1/FVC_PERCENT_PREDICTED: 98
FEV1: 2.53
FEV1/FVC_PERCENT_PREDICTED: 80
FEV1_PERCENT_PREDICTED: 83
FEV1/FVC: 77.85
FEV1/FVC_PERCENT_CHANGE: 75
FEV1/FVC: 79
FVC_PERCENT_PREDICTED: 86
FEV1_PERCENT_CHANGE: 3
FVC_PERCENT_PREDICTED: 82
FEV1_PERCENT_PREDICTED: 80
FVC: 3.1
FVC: 3.25
FEV1_PERCENT_CHANGE: 4
FEV1: 2.44
FEV1_PREDICTED: 3.02
FEV1/FVC_PERCENT_PREDICTED: 97

## 2017-10-11 NOTE — PROCEDURES
Technician: KYAW Kim    Technician Comment:  Good patient effort & cooperation.  The results of this test meet the ATS/ERS standards for acceptability & reproducibility.  Test was performed on the [employer] Body Plethysmograph-Elite DX system.  Predicted values were [facility]-3 for spirometry, [facility] for DLCO, ITS for Lung Volumes.  The DLCO was uncorrected for Hgb.  A bronchodilator of Ventolin HFA -2puffs via spacer administered.      The FVC is 3.25 L or 86% FEV1 is 3.25 L or 86%, FEV1/FVC 78%. %. DLCO 97%. No bronchodilator.    Interpretation:  Normal pulmonary function and gas transfer.   Interpretation:

## 2017-10-11 NOTE — PATIENT INSTRUCTIONS
Plan:    1) Continue CPAP at 9 CM H20 with 2 LPM 02 bleed in.   2) Sleep hygiene discussed in detail. Encouraged daily exercise. Weight loss recommended.   3) Normal PFT's and unremarkable allergen panel.   4) Continue Flonase nasal spray 1-2 sprays/nostril daily.   5) Smoking cessation discussed and recommended. She is working on cutting back.   6) Referral to Cardiology to evaluate for intermittent chest pain with strong family history for early heart disease.   7) She declines Influenza vaccine.   8) Follow up in 3 months after Cardiology eval with repeat compliance card download, sooner if needed.

## 2017-10-11 NOTE — PROGRESS NOTES
Chief Complaint   Patient presents with   • Follow-Up     PFT results       HPI:  Daly Ambrosio is a 46 y.o. year old female here today for follow-up on obstructive sleep apnea. She was initially referred by her PCP, Dr. Jade Talley to evaluate for sleep apnea. She had an overnight oximetry 4/17/2017 which indicates evidence of clustered desaturations with a low 02 of 74%, mean 02 saturation of 85.7% with 252 minutes spent with saturations less than 88%. She was started on nocturnal 02 at 2 LPM. The patient did not feel this was adequate and she increased it to 4 LPM. She had complaints of loud snoring, insomnia, daytime fatigue and morning headaches. Her BMI is 32. She does have a history of Bipolar depression and Hypothyroidism.   Her mother and brother have sleep apnea and are on therapy.   She underwent a Polysomnogram on 8/24/2017. PSG indicates an AHI of 17.9 with a REM index of 36 and a low 02 saturation of 82%. She was split to CPAP pressures 5-10 CM H20. She did best on a CPAP pressure of 9 CM H20 with a resultant AHI of 1 and a mean 02 saturation of 88.8%.   She was started on CPAP at 9 CM H20 with 2 LPM at her last office visit. Compliance card download indicates an AHI of 6 with an average use of 6 hours at night. She has a full face mask which she feels is comfortable. She tolerates the pressure. She does utilize the RAMP. She does feel she is sleeping better at night. She states she is sleeping longer as well. She notes occasional daytime fatigue, but feels this may be related to some of her medications. She denies any morning headaches.     She smokes on average 1/2 pack of cigarettes per day for the last 20 years. She states her PCP recently prescribed Chantix, but cost is an issue. She is working on cutting back. She does have dogs in the home. She feels she may be allergic to the dogs. She notes runny nose, sneezing and itchy watery eyes. She notes mild dyspnea with exertion which  she feels is related to her increased weight. She notes an occasional cough with clear mucous. She denies wheezing. She denies fevers or chills.   PFT's were completed today in the office and indicate an FEV1 of 2.44 L, 80% predicted with an FEV1/FVC ratio of 79 with a DLCO of 97% predicted. Chest xray today in the office is clear. She had an unremarkable allergy panel with an normal IgE and eosinophil count with only a mild sensitivity to D Farinae mite and cockroach.     She states she has had intermittent chest pain with unknown triggers. She states the pain can be a pressure or sharp in nature. She states the pain tends to resolve on it's own. She is concerned as she has a strong family history of heart disease. He mom had 4 MI's and started having heart trouble in her 40's.     Past Medical History:   Diagnosis Date   • thyroidectomy 2009    benign lymphocytic thyroiditis   • Anesthesia     difficulty waking,  combative when waking   • Cigarette smoker    • Hiatus hernia syndrome    • Hypothyroidism, postsurgical    • Indigestion    • Psychiatric problem     bi-polar   • Sleep apnea     no cpap   • Snoring     has had sleep study       Past Surgical History:   Procedure Laterality Date   • LYMPH NODE EXCISION  11/5/2014    Performed by Yasmany Polk M.D. at SURGERY SAME DAY [facility] ORS   • ESOPHAGEAL MOTILITY OR MANOMETRY  5/10/2013    Performed by Junior Frank Jr., M.D. at ENDOSCOPY [facility]   • ACL RECONSTRUCTION SCOPE  10/9/2012    Performed by Lambert Franco M.D. at SURGERY [facility]   • MENISCECTOMY  10/9/2012    Performed by Lambert Franco M.D. at SURGERY [facility] ORS   • ACL RECONSTRUCTION SCOPE  8/19/2009    Performed by TAMIKO BRITT at SURGERY [facility] ORS   • THYROIDECTOMY TOTAL  6/3/2009    Performed by WYATT PEREZ at SURGERY SAME DAY [facility] ORS   • DEBRIDEMENT  2/11/2009    Performed by TAMIKO BRITT at SURGERY [facility]   • BONE GRAFT  2/11/2009     Performed by TAMIKO BRITT at SURGERY [facility] ORS   • KNEE ARTHROSCOPY  2/11/2009    Performed by TAMIKO BRITT at SURGERY [facility] ORS   • HARDWARE REMOVAL ORTHO  2/11/2009    Performed by TAMIKO BRITT at SURGERY [facility] ORS   • KNEE ARTHROSCOPY  4/2008    left   • PB REMOVAL OF OVARY/TUBE(S)  2007   • HYSTERECTOMY LAPAROSCOPY  2007   • KNEE ARTHROSCOPY  2006    left   • PRIMARY C SECTION  2004   • ACL REPAIR  2002    right   • KNEE ARTHROSCOPY  2002    right   • ACL REPAIR  1994    right   • TONSILLECTOMY  1987       Family History   Problem Relation Age of Onset   • Cancer Mother    • Lung Disease Mother      COPD   • Heart Disease Mother    • Cancer Maternal Grandmother        Social History     Social History   • Marital status:      Spouse name: N/A   • Number of children: N/A   • Years of education: N/A     Occupational History   • Not on file.     Social History Main Topics   • Smoking status: Current Every Day Smoker     Packs/day: 0.50     Types: Cigarettes   • Smokeless tobacco: Never Used   • Alcohol use 0.0 - 1.2 oz/week      Comment: 5 PER YR   • Drug use: No   • Sexual activity: Not on file     Other Topics Concern   • Not on file     Social History Narrative   • No narrative on file         ROS:  Constitutional: Denies fevers, chills, sweats, fatigue, weight loss  Eyes: Denies glasses, vision loss, pain, drainage, double vision  Ears/Nose/Mouth/Throat: Denies ear ache, difficulty hearing, sore throat, persistent hoarseness, decayed teeth/toothache  Cardiovascular: Denies palpitations, swelling in feet/legs, fainting, difficulty breathing when laying down  Respiratory: See HPI   GI: Denies heartburn, difficulty swallowing, nausea, vomiting, abdominal pain, diarrhea, constipation  : Denies frequent urination, painful urination  Integumentary: Denies rashes, lumps or color changes  MSK: Denies painful joints, sore muscles, and back pain.   Neurological: Denies frequent  "headaches, dizziness, weakness  Sleep: See HPI       Current Outpatient Prescriptions   Medication Sig Dispense Refill   • estradiol (ESTRACE) 2 MG Tab TAKE 1 TAB BY MOUTH EVERY DAY. 90 Tab 2   • lamotrigine (LAMICTAL) 200 MG tablet TAKE 1 TAB BY MOUTH EVERY DAY. 90 Tab 2   • liothyronine (CYTOMEL) 25 MCG Tab TAKE 1 TAB BY MOUTH EVERY DAY. 90 Tab 2   • SYNTHROID 200 MCG Tab TAKE 1 TAB BY MOUTH EVERY DAY. 90 Tab 2   • liothyronine (CYTOMEL) 25 MCG Tab TAKE 1 TAB BY MOUTH EVERY DAY. 90 Tab 2   • estradiol (ESTRACE) 2 MG Tab TAKE 1 TAB BY MOUTH EVERY DAY. 90 Tab 1   • SYNTHROID 200 MCG Tab TAKE 1 TAB BY MOUTH EVERY DAY. 90 Tab 2   • lamotrigine (LAMICTAL) 200 MG tablet TAKE 1 TAB BY MOUTH EVERY DAY. 90 Tab 2   • zolpidem (AMBIEN) 5 MG Tab Take 1 Tab by mouth at bedtime as needed for Sleep. 3 Tab 0   • varenicline (CHANTIX STARTING MONTH PAK) 0.5 MG X 11 & 1 MG X 42 tablet As directed 56 Tab 3   • eletriptan (RELPAX) 40 MG tablet Take 1 Tab by mouth Once PRN for up to 1 dose. 10 Tab 5   • lorazepam (ATIVAN) 1 MG Tab TAKE 1/2-1 TABLET BY MOUTH 2 TIMES A DAY AS NEEDED 30 Tab 0     No current facility-administered medications for this visit.        Allergies   Allergen Reactions   • Codeine Vomiting   • Norco [Hydrocodone-Acetaminophen] Vomiting   • Other Misc      adhesive   • Penicillins Rash   • Tape      Causes blisters - can't do regular or paper tape - only coban   • Percocet [Oxycodone-Acetaminophen] Itching       Blood pressure 110/64, pulse 73, temperature 36.6 °C (97.9 °F), resp. rate 16, height 1.664 m (5' 5.5\"), weight 90.7 kg (200 lb), SpO2 96 %.    PE:   Appearance: Well developed, well nourished, no acute distress  Eyes: PERRL, EOM intact, sclera white, conjunctiva moist  Ears: no lesions or deformities  Hearing: grossly intact  Nose: no lesions or deformities  Oropharynx: tongue normal, posterior pharynx without erythema or exudate  Mallampati Classification: class 4  Neck: supple, trachea midline, no " masses   Respiratory effort: no intercostal retractions or use of accessory muscles  Lung auscultation: no rales, rhonchi or wheezes  Heart auscultation: no murmur rub or gallop  Extremities: no cyanosis or edema  Abdomen: soft ,non tender, no masses  Gait and Station: normal  Digits and nails: no clubbing, cyanosis, petechiae or nodes.  Cranial nerves: grossly intact  Skin: no rashes, lesions or ulcers noted  Orientation: Oriented to time, person and place  Mood and affect: mood and affect appropriate, normal interaction with examiner  Judgement: Intact          Assessment:  1. JESSICA (obstructive sleep apnea)     2. Obesity (BMI 30-39.9)     3. Cigarette smoker     4. Dyspnea on exertion     5. Allergic rhinitis, unspecified chronicity, unspecified seasonality, unspecified trigger     6. Chest pain, unspecified type  REFERRAL TO CARDIOLOGY         Plan:    1) Continue CPAP at 9 CM H20 with 2 LPM 02 bleed in.   2) Sleep hygiene discussed in detail. Encouraged daily exercise. Weight loss recommended.   3) Normal PFT's and unremarkable allergen panel.   4) Continue Flonase nasal spray 1-2 sprays/nostril daily.   5) Smoking cessation discussed and recommended. She is working on cutting back.   6) Referral to Cardiology to evaluate for intermittent chest pain with strong family history for early heart disease.   7) She declines Influenza vaccine.   8) Follow up in 3 months after Cardiology eval with repeat compliance card download, sooner if needed.

## 2017-10-12 ENCOUNTER — APPOINTMENT (RX ONLY)
Dept: URBAN - METROPOLITAN AREA CLINIC 4 | Facility: CLINIC | Age: 47
Setting detail: DERMATOLOGY
End: 2017-10-12

## 2017-10-12 DIAGNOSIS — L90.5 SCAR CONDITIONS AND FIBROSIS OF SKIN: ICD-10-CM

## 2017-10-12 PROCEDURE — ? POST-OP WOUND CHECK

## 2017-10-12 ASSESSMENT — LOCATION SIMPLE DESCRIPTION DERM: LOCATION SIMPLE: LEFT CHEEK

## 2017-10-12 ASSESSMENT — LOCATION DETAILED DESCRIPTION DERM: LOCATION DETAILED: LEFT INFERIOR CENTRAL MALAR CHEEK

## 2017-10-12 ASSESSMENT — LOCATION ZONE DERM: LOCATION ZONE: FACE

## 2017-10-12 NOTE — HPI: WOUND CHECK
How Is Your Wound Healing?: healing well
Additional History: Pt states there is a bump left from the surgery done. Pt states some drainage from the wound, last week.

## 2017-10-12 NOTE — PROCEDURE: POST-OP WOUND CHECK
Detail Level: Detailed
Add 68136 Cpt? (Important Note: In 2017 The Use Of 13399 Is Being Tracked By Cms To Determine Future Global Period Reimbursement For Global Periods): no

## 2017-10-17 ENCOUNTER — OFFICE VISIT (OUTPATIENT)
Dept: CARDIOLOGY | Facility: MEDICAL CENTER | Age: 47
End: 2017-10-17
Payer: COMMERCIAL

## 2017-10-17 VITALS
HEIGHT: 66 IN | WEIGHT: 202 LBS | DIASTOLIC BLOOD PRESSURE: 82 MMHG | BODY MASS INDEX: 32.47 KG/M2 | HEART RATE: 74 BPM | SYSTOLIC BLOOD PRESSURE: 104 MMHG | OXYGEN SATURATION: 95 %

## 2017-10-17 DIAGNOSIS — F17.210 MODERATE CIGARETTE SMOKER (10-19 PER DAY): ICD-10-CM

## 2017-10-17 DIAGNOSIS — R06.09 DYSPNEA ON EXERTION: ICD-10-CM

## 2017-10-17 DIAGNOSIS — G47.33 OSA (OBSTRUCTIVE SLEEP APNEA): ICD-10-CM

## 2017-10-17 DIAGNOSIS — E78.5 DYSLIPIDEMIA: ICD-10-CM

## 2017-10-17 DIAGNOSIS — R07.89 OTHER CHEST PAIN: ICD-10-CM

## 2017-10-17 DIAGNOSIS — E66.9 OBESITY (BMI 30-39.9): ICD-10-CM

## 2017-10-17 LAB — EKG IMPRESSION: NORMAL

## 2017-10-17 PROCEDURE — 99407 BEHAV CHNG SMOKING > 10 MIN: CPT | Performed by: INTERNAL MEDICINE

## 2017-10-17 PROCEDURE — 93000 ELECTROCARDIOGRAM COMPLETE: CPT | Performed by: INTERNAL MEDICINE

## 2017-10-17 PROCEDURE — 99205 OFFICE O/P NEW HI 60 MIN: CPT | Mod: 25 | Performed by: INTERNAL MEDICINE

## 2017-10-17 RX ORDER — NICOTINE 21 MG/24HR
1 PATCH, TRANSDERMAL 24 HOURS TRANSDERMAL EVERY 24 HOURS
Qty: 14 PATCH | Refills: 0 | Status: SHIPPED | OUTPATIENT
Start: 2017-10-17 | End: 2017-12-26

## 2017-10-17 ASSESSMENT — ENCOUNTER SYMPTOMS
ORTHOPNEA: 1
PALPITATIONS: 1
EYE REDNESS: 1
BLURRED VISION: 1
DEPRESSION: 1
SHORTNESS OF BREATH: 1
PHOTOPHOBIA: 1
NERVOUS/ANXIOUS: 1

## 2017-10-17 NOTE — PROGRESS NOTES
Cardiology Initial Consultation Note    Date of note:    10/17/2017    Primary Care Provider: Jade Talley M.D.  Referring Provider: Daly Melvin A*    Patient Name: Daly Ambrosio   YOB: 1970  MRN:              [id number]    Chief Complaint: chest pain.    History of Present Illness: Daly Ambrosio is a 46 y.o. female whose current medical problems include bipolar disorder, JESSICA, obesity, hypothyroidism, and current smoking who is here for cardiac consultation for chest pain.    She has mild chest pressure which happens about once a month or so for 5 minutes and usually happens at rest.  Gets SOB when walking up a flight of stairs which has been that way for years. Can walk unlimited on flat ground     Does get palpitations when she gets anxious, especially when driving down the freeway.      Is interested in quitting smoking, but can't afford chantix which is $400/month with her insurance.       Review of Systems   Constitution: Positive for malaise/fatigue.   Eyes: Positive for blurred vision, photophobia and redness.   Cardiovascular: Positive for chest pain, orthopnea and palpitations.   Respiratory: Positive for shortness of breath.    Musculoskeletal: Positive for joint pain.   Psychiatric/Behavioral: Positive for depression. The patient is nervous/anxious.    Allergic/Immunologic: Positive for environmental allergies.         All other systems reviewed and negative.      Past Medical History:   Diagnosis Date   • thyroidectomy 2009    benign lymphocytic thyroiditis   • Anesthesia     difficulty waking,  combative when waking   • Cigarette smoker    • Hiatus hernia syndrome    • Hypothyroidism, postsurgical    • Indigestion    • Psychiatric problem     bi-polar   • Sleep apnea     no cpap   • Snoring     has had sleep study         Past Surgical History:   Procedure Laterality Date   • LYMPH NODE EXCISION  11/5/2014    Performed by Yasmany MENDEZ  RYNE Polk at SURGERY SAME DAY [facility] ORS   • ESOPHAGEAL MOTILITY OR MANOMETRY  5/10/2013    Performed by Junior Frank Jr., M.D. at ENDOSCOPY [facility] ORS   • ACL RECONSTRUCTION SCOPE  10/9/2012    Performed by Lambert Franco M.D. at SURGERY [facility] ORS   • MENISCECTOMY  10/9/2012    Performed by Lambert Franco M.D. at SURGERY [facility] ORS   • ACL RECONSTRUCTION SCOPE  8/19/2009    Performed by TAMIKO BRITT at SURGERY [facility] ORS   • THYROIDECTOMY TOTAL  6/3/2009    Performed by WYATT PEREZ at SURGERY SAME DAY [facility] ORS   • DEBRIDEMENT  2/11/2009    Performed by TAMIKO BRITT at SURGERY [facility] ORS   • BONE GRAFT  2/11/2009    Performed by TAMIKO BRITT at SURGERY [facility] ORS   • KNEE ARTHROSCOPY  2/11/2009    Performed by TAMIKO BRITT at SURGERY [facility] ORS   • HARDWARE REMOVAL ORTHO  2/11/2009    Performed by TAMIKO BRITT at SURGERY [facility] ORS   • KNEE ARTHROSCOPY  4/2008    left   • PB REMOVAL OF OVARY/TUBE(S)  2007   • HYSTERECTOMY LAPAROSCOPY  2007   • KNEE ARTHROSCOPY  2006    left   • PRIMARY C SECTION  2004   • ACL REPAIR  2002    right   • KNEE ARTHROSCOPY  2002    right   • ACL REPAIR  1994    right   • TONSILLECTOMY  1987         Current Outpatient Prescriptions   Medication Sig Dispense Refill   • liothyronine (CYTOMEL) 25 MCG Tab TAKE 1 TAB BY MOUTH EVERY DAY. 90 Tab 2   • estradiol (ESTRACE) 2 MG Tab TAKE 1 TAB BY MOUTH EVERY DAY. 90 Tab 1   • SYNTHROID 200 MCG Tab TAKE 1 TAB BY MOUTH EVERY DAY. 90 Tab 2   • lamotrigine (LAMICTAL) 200 MG tablet TAKE 1 TAB BY MOUTH EVERY DAY. 90 Tab 2   • lorazepam (ATIVAN) 1 MG Tab TAKE 1/2-1 TABLET BY MOUTH 2 TIMES A DAY AS NEEDED 30 Tab 0   • varenicline (CHANTIX STARTING MONTH PAK) 0.5 MG X 11 & 1 MG X 42 tablet As directed 56 Tab 3     No current facility-administered medications for this visit.          Allergies   Allergen Reactions   • Codeine Vomiting   • Norco [Hydrocodone-Acetaminophen] Vomiting  "  • Other Misc      adhesive   • Penicillins Rash   • Tape      Causes blisters - can't do regular or paper tape - only coban   • Percocet [Oxycodone-Acetaminophen] Itching         Family History   Problem Relation Age of Onset   • Cancer Mother    • Lung Disease Mother      COPD   • Heart Disease Mother    • Cancer Maternal Grandmother          Social History     Social History   • Marital status:      Spouse name: N/A   • Number of children: N/A   • Years of education: N/A     Occupational History   • Not on file.     Social History Main Topics   • Smoking status: Current Every Day Smoker     Packs/day: 0.50     Types: Cigarettes   • Smokeless tobacco: Never Used   • Alcohol use 0.0 - 1.2 oz/week      Comment: 5 PER YR   • Drug use: No   • Sexual activity: Not on file     Other Topics Concern   • Not on file     Social History Narrative   • No narrative on file         Physical Exam:  Ambulatory Vitals  Blood pressure 104/82, pulse 74, height 1.664 m (5' 5.5\"), weight 91.6 kg (202 lb), SpO2 95 %.   Oxygen Therapy:  Pulse Oximetry: 95 %  BP Readings from Last 4 Encounters:   10/17/17 104/82   10/11/17 110/64   08/29/17 114/72   06/27/17 120/86       Weight/BMI: Body mass index is 33.1 kg/m².  Wt Readings from Last 4 Encounters:   10/17/17 91.6 kg (202 lb)   10/11/17 90.7 kg (200 lb)   10/11/17 90.7 kg (200 lb)   08/29/17 91.7 kg (202 lb 3.2 oz)       General: Well appearing and in no apparent distress  Eyes: nl conjunctiva  ENT: OP clear  Neck: JVP <7 cm H2O, no carotid bruits  Lungs: normal respiratory effort, CTAB  Heart: RRR, 2/6 mid systolic murmur at RUSB, no rubs or gallops, no edema bilateral lower extremities. No LV/RV heave on cardiac palpatation. 2+ bilateral radial pulses.  2+ bilateral dp pulses.   Abdomen: soft, non tender, non distended, no masses, normal bowel sounds.  No HSM.  Extremities/MSK: no clubbing, no cyanosis  Neurological: No focal sensory deficits  Psychiatric: Appropriate affect, " A/O x 3  Skin: Warm extremities      Lab Data Review:  Lab Results   Component Value Date/Time    CHOLSTRLTOT 255 (H) 2017 11:23 AM     (H) 2017 11:23 AM    HDL 51 2017 11:23 AM    TRIGLYCERIDE 210 (H) 2017 11:23 AM       Lab Results   Component Value Date/Time    SODIUM 137 2017 11:23 AM    POTASSIUM 4.5 2017 11:23 AM    CHLORIDE 102 2017 11:23 AM    CO2 28 2017 11:23 AM    GLUCOSE 72 2017 11:23 AM    BUN 10 2017 11:23 AM    CREATININE 0.87 2017 11:23 AM     CrCl cannot be calculated (Patient's most recent lab result is older than the maximum 7 days allowed.).  Lab Results   Component Value Date/Time    ALKPHOSPHAT 84 2017 11:23 AM    ASTSGOT 20 2017 11:23 AM    ALTSGPT 19 2017 11:23 AM    TBILIRUBIN 0.4 2017 11:23 AM      Lab Results   Component Value Date/Time    WBC 7.3 2016 06:33 AM     No results found for: HBA1C  No components found for: TROP      Cardiac Imaging and Procedures Review:    EKG dated today : My personal interpretation is NSR, nonspecific ST changes inferior leads      Medical Decision Makin. Other chest pain  Atypical, however given risk factors and early family history, will r/o ischemia  -stress MPI, exercise    2. Dyslipidemia  Poorly controlled, LDL >190 in the past.  Patient reports her last labs were not fasting  -obtain fasting lipid panel    3. Moderate cigarette smoker (10-19 per day)  Interested in quitting  -nicotine patch.  -quit date on or before 10/30  -I spent approximately 12 minutes discussing the side effects and consequences of active smoking and our treatment plan going forward.        4. JESSICA (obstructive sleep apnea)  Continue O2 at night    5. Obesity (BMI 30-39.9)  Will discuss referral to weight management program next visit    6. Dyspnea on exertion  Stress testing as per above, then will start exercise program, could be component of COPD given smoking history.        Return in about 2 months (around 12/17/2017).      Brice Morrison MD  [facility] Heart and Vascular [facility] Advanced Medicine, Bld[address].  [address] [address]  Phone: [phone]  Fax: [phone]

## 2017-10-17 NOTE — LETTER
Brice Morrison MD  [facility]  [place] for Advanced Medicine, Bldg [address].  [address] [place] [address], [facility] [address]  [place] [address]  Phone: [phone]  Fax: [phone]                 To:   Daly PERLA Alvarez  [address]   [address]  [place] [address]      Dear Jade Talley M.D.,    I had the pleasure of seeing your patient Daly Ambrosio ( - 1970) today in clinic for chest pain.  As you may recall, she is a pleasant 46 y.o. woman with a history of bipolar disorder, obesity, hypothyroidism, current smoking JESSICA on home oxygen at night and hyperlipidemia.  I have ordered her for an echocardiogram and an exercise stress test to evaluate her symptoms.  I have also ordered a fasting cholesterol panel as her last multiple values were high, but she says were not fasting.  Lastly, I have started her on nicotine patches, and she reports her smoking quit date will be 2017. I will see her back in 2 months to ensure compliance.  Thank you for your consultation, and I look forward to providing your patients with excellent cardiovascular care.  Please feel free to contact me at any time if you have any questions.      Gerardo,  Brice Morrison MD  [facility] Cardiology

## 2017-10-17 NOTE — PATIENT INSTRUCTIONS
Please pick your smoking quit date.  The last date you can pick is October 30th, 2017.  On your quit date, please start your nicotine patch 14mg/day once a day on your quit date.    After two weeks, please switch to 7mg/day patches once a day.    After another two weeks (4 weeks from your quit date).  You should be off the patches.  Please call our clinic if you need additional assistance such as nicotine gum or other help.

## 2017-10-17 NOTE — LETTER
Brice Morrison MD  [facility] Heart and Vascular [facility] for Advanced Medicine, Bldg B.  [address], [place] [address]  [place] [address]  Phone: [phone]  Fax: [phone]                Dear Jade Talley M.D.,    I had the pleasure of seeing your patient Daly Ambrosio ( - 1970) today in cardiology clinic at the [facility] and Vascular [facility].  As you may recall, she is a pleasant 46 y.o. woman with a history of bipolar disorder, obesity, hypothyroidism, current smoking JESSICA and hyperlipidemia.  I saw her at the request of her pulmonary team for chest pain.  I have ordered her for an echocardiogram and an exercise stress test to evaluate her symptoms.  I have also ordered a fasting cholesterol panel as her last multiple values were high, but she says were not fasting.  Lastly, I have started her on nicotine patches, and she reports her smoking quit date will be 2017.  Unfortunately she could not afford the chantix, but hopefully this will work for her.  I will see her back in 2 months to ensure compliance.  Please let me know if you have any questions, and I look forward to providing your patients with excellent cardiovascular care.     Brice Carrillo MD  [facility] Cardiology

## 2017-10-26 ENCOUNTER — APPOINTMENT (OUTPATIENT)
Dept: RADIOLOGY | Facility: IMAGING CENTER | Age: 47
End: 2017-10-26
Attending: PHYSICIAN ASSISTANT
Payer: COMMERCIAL

## 2017-10-26 ENCOUNTER — OFFICE VISIT (OUTPATIENT)
Dept: URGENT CARE | Facility: CLINIC | Age: 47
End: 2017-10-26
Payer: COMMERCIAL

## 2017-10-26 VITALS
TEMPERATURE: 97.2 F | HEART RATE: 66 BPM | HEIGHT: 66 IN | DIASTOLIC BLOOD PRESSURE: 70 MMHG | SYSTOLIC BLOOD PRESSURE: 120 MMHG | RESPIRATION RATE: 16 BRPM | BODY MASS INDEX: 32.47 KG/M2 | OXYGEN SATURATION: 97 % | WEIGHT: 202 LBS

## 2017-10-26 DIAGNOSIS — S69.92XA JAMMED FINGER (INTERPHALANGEAL JOINT), LEFT, INITIAL ENCOUNTER: ICD-10-CM

## 2017-10-26 DIAGNOSIS — S69.92XA JAMMED FINGER (INTERPHALANGEAL JOINT), LEFT, INITIAL ENCOUNTER: Primary | ICD-10-CM

## 2017-10-26 PROCEDURE — 99213 OFFICE O/P EST LOW 20 MIN: CPT | Performed by: PHYSICIAN ASSISTANT

## 2017-10-26 PROCEDURE — 73140 X-RAY EXAM OF FINGER(S): CPT | Mod: 26 | Performed by: PHYSICIAN ASSISTANT
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
## 2017-10-27 NOTE — PATIENT INSTRUCTIONS
RICE for Routine Care of Injuries  The routine care of many injuries includes rest, ice, compression, and elevation (RICE). The RICE strategy is often recommended for injuries to soft tissues, such as a muscle strain, ligament injuries, bruises, and overuse injuries. It can also be used for some bony injuries. Using the RICE strategy can help to relieve pain, lessen swelling, and enable your body to heal.  Rest  Rest is required to allow your body to heal. This usually involves reducing your normal activities and avoiding use of the injured part of your body. Generally, you can return to your normal activities when you are comfortable and have been given permission by your health care provider.  Ice  Icing your injury helps to keep the swelling down, and it lessens pain. Do not apply ice directly to your skin.  · Put ice in a plastic bag.  · Place a towel between your skin and the bag.  · Leave the ice on for 20 minutes, 2-3 times a day.  Do this for as long as you are directed by your health care provider.  Compression  Compression means putting pressure on the injured area. Compression helps to keep swelling down, gives support, and helps with discomfort. Compression may be done with an elastic bandage. If an elastic bandage has been applied, follow these general tips:  · Remove and reapply the bandage every 3-4 hours or as directed by your health care provider.  · Make sure the bandage is not wrapped too tightly, because this can cut off circulation. If part of your body beyond the bandage becomes blue, numb, cold, swollen, or more painful, your bandage is most likely too tight. If this occurs, remove your bandage and reapply it more loosely.  · See your health care provider if the bandage seems to be making your problems worse rather than better.  Elevation  Elevation means keeping the injured area raised. This helps to lessen swelling and decrease pain. If possible, your injured area should be elevated at or  above the level of your heart or the center of your chest.  WHEN SHOULD I SEEK MEDICAL CARE?  You should seek medical care if:  · Your pain and swelling continue.  · Your symptoms are getting worse rather than improving.  These symptoms may indicate that further evaluation or further X-rays are needed. Sometimes, X-rays may not show a small broken bone (fracture) until a number of days later. Make a follow-up appointment with your health care provider.  WHEN SHOULD I SEEK IMMEDIATE MEDICAL CARE?  You should seek immediate medical care if:  · You have sudden severe pain at or below the area of your injury.  · You have redness or increased swelling around your injury.  · You have tingling or numbness at or below the area of your injury that does not improve after you remove the elastic bandage.     This information is not intended to replace advice given to you by your health care provider. Make sure you discuss any questions you have with your health care provider.     Document Released: 04/01/2002 Document Revised: 12/23/2014 Document Reviewed: 11/25/2015  [employer] Patient Education ©2016 [employer]

## 2017-11-01 ENCOUNTER — HOSPITAL ENCOUNTER (OUTPATIENT)
Dept: RADIOLOGY | Facility: MEDICAL CENTER | Age: 47
End: 2017-11-01
Attending: INTERNAL MEDICINE
Payer: COMMERCIAL

## 2017-11-01 ENCOUNTER — HOSPITAL ENCOUNTER (OUTPATIENT)
Dept: CARDIOLOGY | Facility: MEDICAL CENTER | Age: 47
End: 2017-11-01
Attending: INTERNAL MEDICINE
Payer: COMMERCIAL

## 2017-11-01 DIAGNOSIS — R06.09 DYSPNEA ON EXERTION: ICD-10-CM

## 2017-11-01 DIAGNOSIS — R07.89 OTHER CHEST PAIN: ICD-10-CM

## 2017-11-01 PROCEDURE — A9502 TC99M TETROFOSMIN: HCPCS

## 2017-11-01 PROCEDURE — 93306 TTE W/DOPPLER COMPLETE: CPT | Mod: 26 | Performed by: INTERNAL MEDICINE

## 2017-11-01 PROCEDURE — 93306 TTE W/DOPPLER COMPLETE: CPT

## 2017-11-01 NOTE — PROGRESS NOTES
Nursing care plan includes knowledge deficit, potential for discomfort, potential for compromised cardiac output. POC includes teaching, comfort measures and reassurance, and access to code cart, cardiology stand by and availability of rapid response team. Pt verbalizes good understanding of benefits and risks of nuclear medicine cardiac stress test. Informed consent obtained. Treadmill started, pt developed the following symptoms none. Patient's 85% target HR is 148. Patient reached a maximum HR of 152, mets of 10.1 and total procedure time of 9.32 minutes. VS stable. To waiting room, caffeinated fluids and/or snacks given, awaiting second scan. Nursing goals met.

## 2017-11-02 ENCOUNTER — TELEPHONE (OUTPATIENT)
Dept: CARDIOLOGY | Facility: MEDICAL CENTER | Age: 47
End: 2017-11-02

## 2017-11-02 LAB
LV EJECT FRACT  99904: 70
LV EJECT FRACT MOD 2C 99903: 73.03
LV EJECT FRACT MOD 4C 99902: 75.96
LV EJECT FRACT MOD BP 99901: 74.94

## 2017-11-02 NOTE — TELEPHONE ENCOUNTER
----- Message from Pia Fernandez R.N. sent at 11/2/2017  8:38 AM PDT -----  Stress test normal, FV 12/18/17

## 2017-11-03 ENCOUNTER — TELEPHONE (OUTPATIENT)
Dept: CARDIOLOGY | Facility: MEDICAL CENTER | Age: 47
End: 2017-11-03

## 2017-11-03 NOTE — TELEPHONE ENCOUNTER
----- Message from Michelle Hein sent at 11/3/2017  1:15 PM PDT -----  Regarding: echo results in yet?  Contact: [phone]  MADDY/veronica    Pt calling to see if the echo results are in yet, she spoke with you yesterday please call Daly at .

## 2017-11-07 ENCOUNTER — HOSPITAL ENCOUNTER (OUTPATIENT)
Dept: RADIOLOGY | Facility: MEDICAL CENTER | Age: 47
End: 2017-11-07
Attending: FAMILY MEDICINE

## 2017-11-07 DIAGNOSIS — Z12.39 SCREENING BREAST EXAMINATION: ICD-10-CM

## 2017-11-07 PROCEDURE — G0202 SCR MAMMO BI INCL CAD: HCPCS

## 2017-12-01 ENCOUNTER — TELEPHONE (OUTPATIENT)
Dept: MEDICAL GROUP | Facility: PHYSICIAN GROUP | Age: 47
End: 2017-12-01

## 2017-12-01 NOTE — TELEPHONE ENCOUNTER
The patient would like to know if you can prescribe some diet pills for her.  She says that she did a lot of testing for her heart and everything came out good.  She also states that she does not have shortness of breath.  Please advise.    Thank you.

## 2017-12-15 ENCOUNTER — HOSPITAL ENCOUNTER (OUTPATIENT)
Dept: LAB | Facility: MEDICAL CENTER | Age: 47
End: 2017-12-15
Attending: INTERNAL MEDICINE
Payer: COMMERCIAL

## 2017-12-15 DIAGNOSIS — E78.5 DYSLIPIDEMIA: ICD-10-CM

## 2017-12-15 LAB
CHOLEST SERPL-MCNC: 196 MG/DL (ref 100–199)
GLUCOSE SERPL-MCNC: 82 MG/DL (ref 65–99)
HDLC SERPL-MCNC: 48 MG/DL
LDLC SERPL CALC-MCNC: 131 MG/DL
TRIGL SERPL-MCNC: 85 MG/DL (ref 0–149)

## 2017-12-15 PROCEDURE — 80061 LIPID PANEL: CPT

## 2017-12-15 PROCEDURE — 36415 COLL VENOUS BLD VENIPUNCTURE: CPT

## 2017-12-15 PROCEDURE — 82947 ASSAY GLUCOSE BLOOD QUANT: CPT

## 2017-12-19 ENCOUNTER — TELEPHONE (OUTPATIENT)
Dept: CARDIOLOGY | Facility: MEDICAL CENTER | Age: 47
End: 2017-12-19

## 2017-12-19 NOTE — TELEPHONE ENCOUNTER
----- Message from Eloy Lopez, Med Ass't sent at 12/19/2017 12:52 PM PST -----  Regarding: Lab results   MADDY Addison pt would like to request for the labs to be released on [employer] or for a cb re them.     Thanks,  Eloy [phone]    =============================================================    Lipid Profile and blood glucose resulted, please review and advise, Thanks!    To Dr Morrison

## 2017-12-20 NOTE — TELEPHONE ENCOUNTER
Brice Morrison M.D.  Pia Fernandez R.N.             The patients cholesterol panel looks much improved.  Please schedule a follow-up appointment sometime in the next month.  Please call her and let them know.  Thank you!   -Dr. Morrison      ==============================================================    S/w pt, discussed lab results and recommendations per Dr Morrison, pt verbalizes understanding.    Noted on the chart that pt have a follow up appt scheduled 12/26/17 w/ Dr Morrison

## 2017-12-22 ENCOUNTER — HOSPITAL ENCOUNTER (OUTPATIENT)
Dept: LAB | Facility: MEDICAL CENTER | Age: 47
End: 2017-12-22
Attending: FAMILY MEDICINE
Payer: COMMERCIAL

## 2017-12-22 ENCOUNTER — OFFICE VISIT (OUTPATIENT)
Dept: MEDICAL GROUP | Facility: PHYSICIAN GROUP | Age: 47
End: 2017-12-22
Payer: COMMERCIAL

## 2017-12-22 VITALS
DIASTOLIC BLOOD PRESSURE: 74 MMHG | RESPIRATION RATE: 16 BRPM | WEIGHT: 201 LBS | HEART RATE: 94 BPM | SYSTOLIC BLOOD PRESSURE: 122 MMHG | OXYGEN SATURATION: 95 % | BODY MASS INDEX: 32.3 KG/M2 | TEMPERATURE: 97.1 F | HEIGHT: 66 IN

## 2017-12-22 DIAGNOSIS — E03.9 ACQUIRED HYPOTHYROIDISM: ICD-10-CM

## 2017-12-22 DIAGNOSIS — L29.9 PRURITIC CONDITION: ICD-10-CM

## 2017-12-22 DIAGNOSIS — R59.1 LYMPHADENOPATHY OF HEAD AND NECK: ICD-10-CM

## 2017-12-22 DIAGNOSIS — G47.33 OSA (OBSTRUCTIVE SLEEP APNEA): ICD-10-CM

## 2017-12-22 DIAGNOSIS — E66.9 OBESITY (BMI 30-39.9): ICD-10-CM

## 2017-12-22 LAB
ALBUMIN SERPL BCP-MCNC: 4.1 G/DL (ref 3.2–4.9)
ALBUMIN/GLOB SERPL: 1.2 G/DL
ALP SERPL-CCNC: 80 U/L (ref 30–99)
ALT SERPL-CCNC: 15 U/L (ref 2–50)
ANION GAP SERPL CALC-SCNC: 6 MMOL/L (ref 0–11.9)
AST SERPL-CCNC: 16 U/L (ref 12–45)
BASOPHILS # BLD AUTO: 0.5 % (ref 0–1.8)
BASOPHILS # BLD: 0.05 K/UL (ref 0–0.12)
BILIRUB SERPL-MCNC: 0.3 MG/DL (ref 0.1–1.5)
BUN SERPL-MCNC: 12 MG/DL (ref 8–22)
CALCIUM SERPL-MCNC: 9.5 MG/DL (ref 8.5–10.5)
CHLORIDE SERPL-SCNC: 104 MMOL/L (ref 96–112)
CO2 SERPL-SCNC: 27 MMOL/L (ref 20–33)
CREAT SERPL-MCNC: 0.93 MG/DL (ref 0.5–1.4)
EOSINOPHIL # BLD AUTO: 0.16 K/UL (ref 0–0.51)
EOSINOPHIL NFR BLD: 1.6 % (ref 0–6.9)
ERYTHROCYTE [DISTWIDTH] IN BLOOD BY AUTOMATED COUNT: 48.3 FL (ref 35.9–50)
GFR SERPL CREATININE-BSD FRML MDRD: >60 ML/MIN/1.73 M 2
GLOBULIN SER CALC-MCNC: 3.5 G/DL (ref 1.9–3.5)
GLUCOSE SERPL-MCNC: 89 MG/DL (ref 65–99)
HCT VFR BLD AUTO: 45.7 % (ref 37–47)
HGB BLD-MCNC: 15.2 G/DL (ref 12–16)
IMM GRANULOCYTES # BLD AUTO: 0.05 K/UL (ref 0–0.11)
IMM GRANULOCYTES NFR BLD AUTO: 0.5 % (ref 0–0.9)
LYMPHOCYTES # BLD AUTO: 3.1 K/UL (ref 1–4.8)
LYMPHOCYTES NFR BLD: 30.4 % (ref 22–41)
MCH RBC QN AUTO: 32.5 PG (ref 27–33)
MCHC RBC AUTO-ENTMCNC: 33.3 G/DL (ref 33.6–35)
MCV RBC AUTO: 97.6 FL (ref 81.4–97.8)
MONOCYTES # BLD AUTO: 0.46 K/UL (ref 0–0.85)
MONOCYTES NFR BLD AUTO: 4.5 % (ref 0–13.4)
NEUTROPHILS # BLD AUTO: 6.39 K/UL (ref 2–7.15)
NEUTROPHILS NFR BLD: 62.5 % (ref 44–72)
NRBC # BLD AUTO: 0 K/UL
NRBC BLD-RTO: 0 /100 WBC
PLATELET # BLD AUTO: 310 K/UL (ref 164–446)
PMV BLD AUTO: 9.3 FL (ref 9–12.9)
POTASSIUM SERPL-SCNC: 3.9 MMOL/L (ref 3.6–5.5)
PROT SERPL-MCNC: 7.6 G/DL (ref 6–8.2)
RBC # BLD AUTO: 4.68 M/UL (ref 4.2–5.4)
SODIUM SERPL-SCNC: 137 MMOL/L (ref 135–145)
T4 FREE SERPL-MCNC: 0.69 NG/DL (ref 0.53–1.43)
TSH SERPL DL<=0.005 MIU/L-ACNC: 7.64 UIU/ML (ref 0.38–5.33)
WBC # BLD AUTO: 10.2 K/UL (ref 4.8–10.8)

## 2017-12-22 PROCEDURE — 99214 OFFICE O/P EST MOD 30 MIN: CPT | Performed by: FAMILY MEDICINE

## 2017-12-22 PROCEDURE — 84439 ASSAY OF FREE THYROXINE: CPT

## 2017-12-22 PROCEDURE — 84443 ASSAY THYROID STIM HORMONE: CPT

## 2017-12-22 PROCEDURE — 80053 COMPREHEN METABOLIC PANEL: CPT

## 2017-12-22 PROCEDURE — 36415 COLL VENOUS BLD VENIPUNCTURE: CPT

## 2017-12-22 PROCEDURE — 85025 COMPLETE CBC W/AUTO DIFF WBC: CPT

## 2017-12-22 RX ORDER — PHENTERMINE HYDROCHLORIDE 37.5 MG/1
37.5 CAPSULE ORAL EVERY MORNING
Qty: 90 CAP | Refills: 0 | Status: SHIPPED | OUTPATIENT
Start: 2017-12-22 | End: 2018-03-22

## 2017-12-22 ASSESSMENT — PATIENT HEALTH QUESTIONNAIRE - PHQ9: CLINICAL INTERPRETATION OF PHQ2 SCORE: 0

## 2017-12-22 NOTE — PROGRESS NOTES
Chief Complaint   Patient presents with   • Follow-Up     meds       HISTORY OF PRESENT ILLNESS: Patient is a 47 y.o. female established patient here today for the following concerns:    1. Lymphadenopathy of head and neck  2. Pruritic condition  Here today with now 2 months of posterior itching in the hair line at the nape of the neck with right sided LAD.  Took some antibiotics (unsure which one) for a week that she had at home which did not improve the lymph node or the itching.  There has been no rashes in the area.  No new products.  No other areas of pruritis.  No unintentional weight loss, night sweats or other B-symptoms.     3. Acquired hypothyroidism  Due for recheck on thyroid. Continues on Synthroid and cytomel.  She reports energy level has been good.      4. Obesity (BMI 30-39.9)  Frustrated with weight.  Hoping to start phentermine.  She has had extensive cardiac work up which has been negative and has dx of JESSICA.     5. JESSICA (obstructive sleep apnea)  She is now on CPAP and oxygen at night.       Past Medical, Social, and Family history reviewed and updated in EPIC    Allergies:Codeine; Norco [hydrocodone-acetaminophen]; Other misc; Penicillins; Tape; and Percocet [oxycodone-acetaminophen]    Current Outpatient Prescriptions   Medication Sig Dispense Refill   • phentermine 37.5 MG capsule Take 1 Cap by mouth every morning for 90 days. 90 Cap 0   • nicotine (NICODERM) 14 MG/24HR PATCH 24 HR Apply 1 Patch to skin as directed every 24 hours. 14 Patch 0   • liothyronine (CYTOMEL) 25 MCG Tab TAKE 1 TAB BY MOUTH EVERY DAY. 90 Tab 2   • estradiol (ESTRACE) 2 MG Tab TAKE 1 TAB BY MOUTH EVERY DAY. 90 Tab 1   • SYNTHROID 200 MCG Tab TAKE 1 TAB BY MOUTH EVERY DAY. 90 Tab 2   • lamotrigine (LAMICTAL) 200 MG tablet TAKE 1 TAB BY MOUTH EVERY DAY. 90 Tab 2   • lorazepam (ATIVAN) 1 MG Tab TAKE 1/2-1 TABLET BY MOUTH 2 TIMES A DAY AS NEEDED 30 Tab 0     No current facility-administered medications for this visit.   "        ROS:  Review of Systems   Constitutional: Negative for fever, chills, weight loss and malaise/fatigue.   HENT: Negative for ear pain, nosebleeds, congestion, sore throat and neck pain.    Eyes: Negative for blurred vision.   Respiratory: Negative for cough, sputum production, shortness of breath and wheezing.    Cardiovascular: Negative for chest pain, palpitations,  and leg swelling.   Gastrointestinal: Negative for heartburn, nausea, vomiting, diarrhea and abdominal pain.   Genitourinary: Negative for dysuria, urgency and frequency.   Musculoskeletal: Negative for myalgias, back pain and joint pain.   Skin: Negative for rash and itching.   Neurological: Negative for dizziness, tingling, tremors, sensory change, focal weakness and headaches.   Endo/Heme/Allergies: Does not bruise/bleed easily.   Psychiatric/Behavioral: Negative for depression, anxiety, suicidal ideas, insomnia and memory loss.      Exam:  Blood pressure 122/74, pulse 94, temperature 36.2 °C (97.1 °F), resp. rate 16, height 1.664 m (5' 5.5\"), weight 91.2 kg (201 lb), SpO2 95 %.    General:  Well nourished, well developed in NAD  Head is grossly normal. Posterior right occipital node, no overlying rash.   Neck: Supple without JVD   Pulmonary:  Normal effort.   Cardiovascular: Regular rate  Extremities: no clubbing, cyanosis, or edema.  Psych: affect appropriate      Please note that this dictation was created using voice recognition software. I have made every reasonable attempt to correct obvious errors, but I expect that there are errors of grammar and possibly content that I did not discover before finalizing the note.    Assessment/Plan:  1. Lymphadenopathy of head and neck  Check   - CT-SOFT TISSUE NECK WITH; Future  - CBC WITH DIFFERENTIAL; Future  - COMP METABOLIC PANEL; Future    2. Pruritic condition  Check   - CT-SOFT TISSUE NECK WITH; Future  - CBC WITH DIFFERENTIAL; Future  - COMP METABOLIC PANEL; Future    3. Acquired " hypothyroidism  Continue current dose.   - TSH WITH REFLEX TO FT4; Future    4. Obesity (BMI 30-39.9)    - Patient identified as having weight management issue.  Appropriate orders and counseling given.  - phentermine 37.5 MG capsule; Take 1 Cap by mouth every morning for 90 days.  Dispense: 90 Cap; Refill: 0    5. JESSICA (obstructive sleep apnea)  Continue CPAP and O2, weight reduction    3 month follow up

## 2017-12-26 ENCOUNTER — OFFICE VISIT (OUTPATIENT)
Dept: CARDIOLOGY | Facility: MEDICAL CENTER | Age: 47
End: 2017-12-26
Payer: COMMERCIAL

## 2017-12-26 ENCOUNTER — HOSPITAL ENCOUNTER (OUTPATIENT)
Dept: RADIOLOGY | Facility: MEDICAL CENTER | Age: 47
End: 2017-12-26
Attending: FAMILY MEDICINE
Payer: COMMERCIAL

## 2017-12-26 VITALS
BODY MASS INDEX: 31.39 KG/M2 | SYSTOLIC BLOOD PRESSURE: 110 MMHG | WEIGHT: 200 LBS | DIASTOLIC BLOOD PRESSURE: 70 MMHG | HEIGHT: 67 IN | HEART RATE: 100 BPM | OXYGEN SATURATION: 96 %

## 2017-12-26 DIAGNOSIS — R07.89 OTHER CHEST PAIN: ICD-10-CM

## 2017-12-26 DIAGNOSIS — G47.33 OSA (OBSTRUCTIVE SLEEP APNEA): ICD-10-CM

## 2017-12-26 DIAGNOSIS — F17.210 MODERATE CIGARETTE SMOKER (10-19 PER DAY): ICD-10-CM

## 2017-12-26 DIAGNOSIS — L29.9 PRURITIC CONDITION: ICD-10-CM

## 2017-12-26 DIAGNOSIS — R59.1 LYMPHADENOPATHY OF HEAD AND NECK: ICD-10-CM

## 2017-12-26 DIAGNOSIS — Z13.6 SCREENING FOR CARDIOVASCULAR CONDITION: ICD-10-CM

## 2017-12-26 PROCEDURE — 70491 CT SOFT TISSUE NECK W/DYE: CPT

## 2017-12-26 PROCEDURE — 700117 HCHG RX CONTRAST REV CODE 255: Performed by: FAMILY MEDICINE

## 2017-12-26 PROCEDURE — 99214 OFFICE O/P EST MOD 30 MIN: CPT | Performed by: INTERNAL MEDICINE

## 2017-12-26 RX ADMIN — IOHEXOL 100 ML: 350 INJECTION, SOLUTION INTRAVENOUS at 09:43

## 2017-12-26 ASSESSMENT — ENCOUNTER SYMPTOMS
SHORTNESS OF BREATH: 0
CHILLS: 0
PND: 0
ORTHOPNEA: 0
FEVER: 0
SYNCOPE: 0
SPUTUM PRODUCTION: 0
NIGHT SWEATS: 0
DYSPNEA ON EXERTION: 0
HEMATOCHEZIA: 0
LEFT EYE: 0
RIGHT EYE: 0
FOCAL WEAKNESS: 0
IRREGULAR HEARTBEAT: 0
BLURRED VISION: 0
HEMATEMESIS: 0

## 2017-12-27 ENCOUNTER — TELEPHONE (OUTPATIENT)
Dept: CARDIOLOGY | Facility: MEDICAL CENTER | Age: 47
End: 2017-12-27

## 2017-12-27 NOTE — LETTER
December 27, 2017        Daly Ambrosio  [address]  [place] [address]        Dear Daly:    Your Referral to Tobacco Cessation was sent to [facility], please contact [phone] if you have not heard from them in a timely manner.    If you have questions or concerns, don't hesitate to contact our office, [phone].        Sincerely,          Brice Morrison M.D.

## 2017-12-28 NOTE — TELEPHONE ENCOUNTER
"Teodoro Kelly - REFERRAL TO TOBACCO CESSATION << Less Detail',event)\" href=\"javascript:;\">More Detail >>      REFERRAL TO TOBACCO CESSATION    Teodoro Kelly   Sent: Wed December 27, 2017  4:00 PM   To: Pia Fernandez R.N.                  Message     The referral will be faxed to:   [facility]   [phone]       ===============================================================    Letter mailed w/ above information   "

## 2018-01-15 ENCOUNTER — OFFICE VISIT (OUTPATIENT)
Dept: PULMONOLOGY | Facility: HOSPICE | Age: 48
End: 2018-01-15
Payer: COMMERCIAL

## 2018-01-15 VITALS
RESPIRATION RATE: 16 BRPM | TEMPERATURE: 98.6 F | SYSTOLIC BLOOD PRESSURE: 126 MMHG | WEIGHT: 201 LBS | BODY MASS INDEX: 31.55 KG/M2 | HEART RATE: 99 BPM | OXYGEN SATURATION: 95 % | DIASTOLIC BLOOD PRESSURE: 78 MMHG | HEIGHT: 67 IN

## 2018-01-15 DIAGNOSIS — F17.200 TOBACCO DEPENDENCE: ICD-10-CM

## 2018-01-15 DIAGNOSIS — G47.33 OSA (OBSTRUCTIVE SLEEP APNEA): ICD-10-CM

## 2018-01-15 DIAGNOSIS — R06.00 DYSPNEA, UNSPECIFIED TYPE: ICD-10-CM

## 2018-01-15 DIAGNOSIS — J30.9 ALLERGIC RHINITIS, UNSPECIFIED CHRONICITY, UNSPECIFIED SEASONALITY, UNSPECIFIED TRIGGER: ICD-10-CM

## 2018-01-15 DIAGNOSIS — R07.9 CHEST PAIN, UNSPECIFIED TYPE: ICD-10-CM

## 2018-01-15 PROCEDURE — 99214 OFFICE O/P EST MOD 30 MIN: CPT | Performed by: NURSE PRACTITIONER

## 2018-01-15 NOTE — PATIENT INSTRUCTIONS
Plan:    1) Continue CPAP at 9 CM H20 with 2 LPM 02 bleed in. Order for mask and supplies sent to her DME.   2) Sleep hygiene discussed in detail. Encouraged daily exercise. Weight loss recommended.   3) Normal PFT's and unremarkable allergen panel.   4) Continue Flonase nasal spray 1-2 sprays/nostril daily.   5) Smoking cessation discussed and recommended. She is working on cutting back. She is also pending start of the Smoking Cessation program through [employer].   6) Unremarkable Cardiac work up.   7) She declines Influenza vaccine.   8) Annual follow up repeat compliance card download, sooner if needed.

## 2018-01-15 NOTE — PROGRESS NOTES
Chief Complaint   Patient presents with   • Apnea       HPI:  Daly Ambrosio is a 47 y.o. year old female here today for follow-up on her obstructive sleep apnea. She was initially referred by her PCP, Dr. Jade Talley to evaluate for sleep apnea. She had an overnight oximetry 4/17/2017 which indicates evidence of clustered desaturations with a low 02 of 74%, mean 02 saturation of 85.7% with 252 minutes spent with saturations less than 88%. She was started on nocturnal 02 at 2 LPM. The patient did not feel this was adequate and she increased it to 4 LPM. She had complaints of loud snoring, insomnia, daytime fatigue and morning headaches. Her BMI is 31. She does have a history of Bipolar depression and Hypothyroidism.   Her mother and brother have sleep apnea and are on therapy.   She underwent a Polysomnogram on 8/24/2017. PSG indicates an AHI of 17.9 with a REM index of 36 and a low 02 saturation of 82%. She was split to CPAP pressures 5-10 CM H20. She did best on a CPAP pressure of 9 CM H20 with a resultant AHI of 1 and a mean 02 saturation of 88.8%.   She was started on CPAP at 9 CM H20 with 2 LPM. Compliance card download indicates an AHI of 4.5 with an average use of 5-6 hours at night. She has a full face mask which she feels is comfortable. She tolerates the pressure. She does utilize the RAMP. She does feel she is sleeping better at night. She states she is sleeping longer as well. She denies any morning headaches.      She smokes on average 1/2 pack of cigarettes per day for the last 20 years. She states her PCP prescribed Chantix, but cost was an issue. She is working on cutting back. She was referred to [employer] smoking cessation program which she has yet to start. She does have dogs in the home. She feels she may be allergic to the dogs. She notes runny nose, sneezing and itchy watery eyes. She notes mild dyspnea with exertion which she feels is related to her increased weight. She feels  her dyspnea has improved some. She notes an occasional cough with clear mucous. She denies hemoptysis. She denies wheezing. She denies fevers or chills.   PFT's were completed at her last office visit and indicates an FEV1 of 2.44 L, 80% predicted with an FEV1/FVC ratio of 79 with a DLCO of 97% predicted. Chest xray at her last office visit was clear. She had an unremarkable allergy panel with an normal IgE and eosinophil count with only a mild sensitivity to D Farinae mite and cockroach.      She had complaints of intermittent chest pain as well. She was referred to Cardiology and had an unremarkable work up with a negative stress test and unremarkable Echo.       Past Medical History:   Diagnosis Date   • Anesthesia     difficulty waking,  combative when waking   • Cigarette smoker    • Hiatus hernia syndrome    • Hypothyroidism, postsurgical    • Indigestion    • Psychiatric problem     bi-polar   • Sleep apnea     no cpap   • Snoring     has had sleep study   • thyroidectomy 2009    benign lymphocytic thyroiditis       Past Surgical History:   Procedure Laterality Date   • LYMPH NODE EXCISION  11/5/2014    Performed by Yasmany Polk M.D. at SURGERY SAME DAY [facility]   • ESOPHAGEAL MOTILITY OR MANOMETRY  5/10/2013    Performed by Junior Frank Jr., M.D. at ENDOSCOPY [facility]   • ACL RECONSTRUCTION SCOPE  10/9/2012    Performed by Lambert Franco M.D. at SURGERY [facility]   • MENISCECTOMY  10/9/2012    Performed by Lambert Franco M.D. at SURGERY [facility]   • ACL RECONSTRUCTION SCOPE  8/19/2009    Performed by TAMIKO BRITT at SURGERY [facility]   • THYROIDECTOMY TOTAL  6/3/2009    Performed by WYATT PEREZ at SURGERY SAME DAY [facility]   • DEBRIDEMENT  2/11/2009    Performed by TAMIKO BRITT at SURGERY [facility]   • BONE GRAFT  2/11/2009    Performed by TAMIKO BRITT at SURGERY [facility]   • KNEE ARTHROSCOPY  2/11/2009    Performed by TAMIKO BRITT  at SURGERY [facility]   • HARDWARE REMOVAL ORTHO  2/11/2009    Performed by TAMIKO BRITT at SURGERY [facility]   • KNEE ARTHROSCOPY  4/2008    left   • PB REMOVAL OF OVARY/TUBE(S)  2007   • HYSTERECTOMY LAPAROSCOPY  2007   • KNEE ARTHROSCOPY  2006    left   • PRIMARY C SECTION  2004   • ACL REPAIR  2002    right   • KNEE ARTHROSCOPY  2002    right   • ACL REPAIR  1994    right   • TONSILLECTOMY  1987       Family History   Problem Relation Age of Onset   • Cancer Mother    • Lung Disease Mother      COPD   • Heart Disease Mother    • Heart Attack Mother 45   • Breast Cancer Mother    • Cancer Maternal Grandmother        Social History     Social History   • Marital status:      Spouse name: N/A   • Number of children: N/A   • Years of education: N/A     Occupational History   • Not on file.     Social History Main Topics   • Smoking status: Current Every Day Smoker     Packs/day: 0.50     Types: Cigarettes   • Smokeless tobacco: Never Used   • Alcohol use 0.0 - 1.2 oz/week      Comment: 5 PER YR   • Drug use:      Types: Marijuana   • Sexual activity: Not on file     Other Topics Concern   • Not on file     Social History Narrative   • No narrative on file       ROS:  Constitutional: Denies fevers, chills, sweats, weight loss  Eyes: Denies glasses, vision loss, pain, drainage, double vision  Ears/Nose/Mouth/Throat: Denies  ear ache, difficulty hearing, sore throat, persistent hoarseness, decayed teeth/toothache  Cardiovascular: Denies chest pain, tightness, palpitations, swelling in feet/legs, fainting, difficulty breathing when laying down  Respiratory: See HPI   GI: Denies heartburn, difficulty swallowing, nausea, vomiting, abdominal pain, diarrhea, constipation  : Denies frequent urination, painful urination  Integumentary: Denies rashes, lumps or color changes  MSK: Denies painful joints, sore muscles, and back pain.   Neurological: Denies frequent headaches, dizziness, weakness  Sleep: See  "HPI       Current Outpatient Prescriptions   Medication Sig Dispense Refill   • aspirin 81 MG tablet Take 1 Tab by mouth every day. 90 Tab 3   • phentermine 37.5 MG capsule Take 1 Cap by mouth every morning for 90 days. 90 Cap 0   • liothyronine (CYTOMEL) 25 MCG Tab TAKE 1 TAB BY MOUTH EVERY DAY. 90 Tab 2   • estradiol (ESTRACE) 2 MG Tab TAKE 1 TAB BY MOUTH EVERY DAY. 90 Tab 1   • SYNTHROID 200 MCG Tab TAKE 1 TAB BY MOUTH EVERY DAY. 90 Tab 2   • lamotrigine (LAMICTAL) 200 MG tablet TAKE 1 TAB BY MOUTH EVERY DAY. 90 Tab 2   • lorazepam (ATIVAN) 1 MG Tab TAKE 1/2-1 TABLET BY MOUTH 2 TIMES A DAY AS NEEDED 30 Tab 0     No current facility-administered medications for this visit.        Allergies   Allergen Reactions   • Codeine Vomiting   • Norco [Hydrocodone-Acetaminophen] Vomiting   • Other Misc      adhesive   • Penicillins Rash   • Tape      Causes blisters - can't do regular or paper tape - only coban   • Percocet [Oxycodone-Acetaminophen] Itching       Blood pressure 126/78, pulse 99, temperature 37 °C (98.6 °F), resp. rate 16, height 1.702 m (5' 7\"), weight 91.2 kg (201 lb), SpO2 95 %.    PE:   Appearance: Well developed, well nourished, no acute distress  Eyes: PERRL, EOM intact, sclera white, conjunctiva moist  Ears: no lesions or deformities  Hearing: grossly intact  Nose: no lesions or deformities  Oropharynx: tongue normal, posterior pharynx without erythema or exudate  Mallampati Classification: Class 4  Neck: supple, trachea midline, no masses   Respiratory effort: no intercostal retractions or use of accessory muscles  Lung auscultation: no rales, rhonchi or wheezes  Heart auscultation: no murmur rub or gallop  Extremities: no cyanosis or edema  Abdomen: soft ,non tender, no masses  Gait and Station: normal  Digits and nails: no clubbing, cyanosis, petechiae or nodes.  Cranial nerves: grossly intact  Skin: no rashes, lesions or ulcers noted  Orientation: Oriented to time, person and place  Mood and " affect: mood and affect appropriate, normal interaction with examiner  Judgement: Intact          Assessment:  1. JESSICA (obstructive sleep apnea)  DME MASK AND SUPPLIES   2. BMI 31.0-31.9,adult     3. Tobacco dependence     4. Dyspnea, unspecified type     5. Allergic rhinitis, unspecified chronicity, unspecified seasonality, unspecified trigger     6. Chest pain, unspecified type           Plan:    1) Continue CPAP at 9 CM H20 with 2 LPM 02 bleed in. Order for mask and supplies sent to her DME.   2) Sleep hygiene discussed in detail. Encouraged daily exercise. Weight loss recommended.   3) Normal PFT's and unremarkable allergen panel.   4) Continue Flonase nasal spray 1-2 sprays/nostril daily.   5) Smoking cessation discussed and recommended. She is working on [employer] back. She is also pending start of the Smoking Cessation program through [employer].   6) Unremarkable Cardiac work up.   7) She declines Influenza vaccine.   8) Annual follow up repeat compliance card download, sooner if needed.

## 2018-05-01 ENCOUNTER — OFFICE VISIT (OUTPATIENT)
Dept: MEDICAL GROUP | Facility: PHYSICIAN GROUP | Age: 48
End: 2018-05-01
Payer: COMMERCIAL

## 2018-05-01 VITALS
HEART RATE: 90 BPM | OXYGEN SATURATION: 95 % | HEIGHT: 67 IN | RESPIRATION RATE: 16 BRPM | DIASTOLIC BLOOD PRESSURE: 72 MMHG | BODY MASS INDEX: 29.51 KG/M2 | TEMPERATURE: 97.5 F | SYSTOLIC BLOOD PRESSURE: 124 MMHG | WEIGHT: 188 LBS

## 2018-05-01 DIAGNOSIS — F31.62 BIPOLAR DISORDER, CURRENT EPISODE MIXED, MODERATE (HCC): ICD-10-CM

## 2018-05-01 DIAGNOSIS — E89.0 HYPOTHYROIDISM, POSTSURGICAL: ICD-10-CM

## 2018-05-01 PROBLEM — E66.9 OBESITY (BMI 30-39.9): Status: RESOLVED | Noted: 2017-03-28 | Resolved: 2018-05-01

## 2018-05-01 PROCEDURE — 99213 OFFICE O/P EST LOW 20 MIN: CPT | Performed by: FAMILY MEDICINE

## 2018-05-01 RX ORDER — PHENTERMINE HYDROCHLORIDE 37.5 MG/1
37.5 TABLET ORAL
Qty: 90 TAB | Refills: 0 | Status: SHIPPED | OUTPATIENT
Start: 2018-05-01 | End: 2018-07-30

## 2018-05-01 RX ORDER — PHENTERMINE HYDROCHLORIDE 37.5 MG/1
37.5 TABLET ORAL
COMMUNITY
End: 2019-06-04 | Stop reason: SDUPTHER

## 2018-05-01 RX ORDER — TOPIRAMATE 25 MG/1
25 TABLET ORAL 2 TIMES DAILY
Qty: 180 TAB | Refills: 3 | Status: SHIPPED | OUTPATIENT
Start: 2018-05-01 | End: 2018-10-23 | Stop reason: SDUPTHER

## 2018-05-01 NOTE — PROGRESS NOTES
Chief Complaint   Patient presents with   • Obesity       HISTORY OF PRESENT ILLNESS: Patient is a 47 y.o. female established patient here today for the following concerns:    1. BMI 29.0-29.9,adult  Here today for follow up on weight management.  Reports that she is doing ok with the phentermine without side effect noted, but requests that we add the topamax for additional loss as she feels she has stalled out.      2. Hypothyroidism, postsurgical  Continues on the synthroid and Cytomel with good control, much improved labs.      3. Bipolar Disorder  Reports having some increase in mood disturbance with increase stress at home.  She denies any SI or HI.  Would like to increase her medications.      Past Medical, Social, and Family history reviewed and updated in EPIC    Allergies:Codeine; Norco [hydrocodone-acetaminophen]; Other misc; Penicillins; Tape; and Percocet [oxycodone-acetaminophen]    Current Outpatient Prescriptions   Medication Sig Dispense Refill   • phentermine (ADIPEX-P) 37.5 MG tablet Take 37.5 mg by mouth every morning before breakfast.     • topiramate (TOPAMAX) 25 MG Tab Take 1 Tab by mouth 2 times a day. 180 Tab 3   • phentermine (ADIPEX-P) 37.5 MG tablet Take 1 Tab by mouth every morning before breakfast for 90 days. 90 Tab 0   • liothyronine (CYTOMEL) 25 MCG Tab TAKE 1 TAB BY MOUTH EVERY DAY. 90 Tab 2   • estradiol (ESTRACE) 2 MG Tab TAKE 1 TAB BY MOUTH EVERY DAY. 90 Tab 1   • SYNTHROID 200 MCG Tab TAKE 1 TAB BY MOUTH EVERY DAY. 90 Tab 2   • lamotrigine (LAMICTAL) 200 MG tablet TAKE 1 TAB BY MOUTH EVERY DAY. 90 Tab 2   • lorazepam (ATIVAN) 1 MG Tab TAKE 1/2-1 TABLET BY MOUTH 2 TIMES A DAY AS NEEDED 30 Tab 0   • aspirin 81 MG tablet Take 1 Tab by mouth every day. 90 Tab 3     No current facility-administered medications for this visit.          ROS:  Review of Systems   Constitutional: Negative for fever, chills, weight loss and malaise/fatigue.   HENT: Negative for ear pain, nosebleeds,  "congestion, sore throat and neck pain.    Eyes: Negative for blurred vision.   Respiratory: Negative for cough, sputum production, shortness of breath and wheezing.    Cardiovascular: Negative for chest pain, palpitations,  and leg swelling.   Gastrointestinal: Negative for heartburn, nausea, vomiting, diarrhea and abdominal pain.   Genitourinary: Negative for dysuria, urgency and frequency.   Musculoskeletal: Negative for myalgias, back pain and joint pain.   Skin: Negative for rash and itching.   Neurological: Negative for dizziness, tingling, tremors, sensory change, focal weakness and headaches.   Endo/Heme/Allergies: Does not bruise/bleed easily.   Psychiatric/Behavioral: Negative for depression, anxiety, suicidal ideas, insomnia and memory loss.      Exam:  Blood pressure 124/72, pulse 90, temperature 36.4 °C (97.5 °F), resp. rate 16, height 1.702 m (5' 7\"), weight 85.3 kg (188 lb), SpO2 95 %.    General:  Well nourished, well developed in NAD  Head is grossly normal.  Neck: Supple without JVD   Pulmonary:  Normal effort.   Cardiovascular: Regular rate  Extremities: no clubbing, cyanosis, or edema.  Psych: affect appropriate      Please note that this dictation was created using voice recognition software. I have made every reasonable attempt to correct obvious errors, but I expect that there are errors of grammar and possibly content that I did not discover before finalizing the note.    Assessment/Plan:  1. BMI 29.0-29.9,adult    - phentermine (ADIPEX-P) 37.5 MG tablet; Take 37.5 mg by mouth every morning before breakfast.  - topiramate (TOPAMAX) 25 MG Tab; Take 1 Tab by mouth 2 times a day.  Dispense: 180 Tab; Refill: 3  - phentermine (ADIPEX-P) 37.5 MG tablet; Take 1 Tab by mouth every morning before breakfast for 90 days.  Dispense: 90 Tab; Refill: 0    2. Hypothyroidism, postsurgical  Continue current dose    3. Bipolar disorder  Will add the topamax and re-evaluate.  May need to stop phentermine if " affecting mood.

## 2018-06-04 DIAGNOSIS — E66.3 OVERWEIGHT: ICD-10-CM

## 2018-06-05 RX ORDER — PHENTERMINE HYDROCHLORIDE 37.5 MG/1
37.5 CAPSULE ORAL EVERY MORNING
Qty: 90 CAP | Refills: 0 | Status: SHIPPED
Start: 2018-06-05 | End: 2018-09-03

## 2018-08-23 DIAGNOSIS — F39 MOOD DISORDER (HCC): ICD-10-CM

## 2018-08-23 DIAGNOSIS — F51.01 PRIMARY INSOMNIA: ICD-10-CM

## 2018-08-23 RX ORDER — LORAZEPAM 1 MG/1
.5-1 TABLET ORAL 2 TIMES DAILY PRN
Qty: 30 TAB | Refills: 0 | Status: SHIPPED | OUTPATIENT
Start: 2018-08-23 | End: 2018-09-22

## 2018-10-23 ENCOUNTER — PATIENT MESSAGE (OUTPATIENT)
Dept: MEDICAL GROUP | Facility: PHYSICIAN GROUP | Age: 48
End: 2018-10-23

## 2018-10-23 ENCOUNTER — TELEPHONE (OUTPATIENT)
Dept: CARDIOLOGY | Facility: MEDICAL CENTER | Age: 48
End: 2018-10-23

## 2018-10-23 ENCOUNTER — PATIENT MESSAGE (OUTPATIENT)
Dept: CARDIOLOGY | Facility: MEDICAL CENTER | Age: 48
End: 2018-10-23

## 2018-10-23 DIAGNOSIS — F17.210 MODERATE CIGARETTE SMOKER (10-19 PER DAY): ICD-10-CM

## 2018-10-23 DIAGNOSIS — Z13.6 SCREENING FOR CARDIOVASCULAR CONDITION: ICD-10-CM

## 2018-10-23 DIAGNOSIS — I77.9 AORTA DISORDER (HCC): ICD-10-CM

## 2018-10-23 DIAGNOSIS — R13.19 OTHER DYSPHAGIA: ICD-10-CM

## 2018-10-23 RX ORDER — LAMOTRIGINE 200 MG/1
200 TABLET ORAL
Qty: 90 TAB | Refills: 3 | Status: SHIPPED | OUTPATIENT
Start: 2018-10-23

## 2018-10-23 RX ORDER — TOPIRAMATE 25 MG/1
25 TABLET ORAL 2 TIMES DAILY
Qty: 180 TAB | Refills: 3 | Status: SHIPPED | OUTPATIENT
Start: 2018-10-23 | End: 2019-11-23 | Stop reason: SDUPTHER

## 2018-10-23 RX ORDER — LEVOTHYROXINE SODIUM 200 MCG
200 TABLET ORAL
Qty: 90 TAB | Refills: 3 | Status: SHIPPED | OUTPATIENT
Start: 2018-10-23

## 2018-10-23 RX ORDER — LIOTHYRONINE SODIUM 25 UG/1
25 TABLET ORAL
Qty: 90 TAB | Refills: 3 | Status: SHIPPED | OUTPATIENT
Start: 2018-10-23 | End: 2020-01-07 | Stop reason: SDUPTHER

## 2018-10-23 RX ORDER — ESTRADIOL 2 MG/1
2 TABLET ORAL
Qty: 90 TAB | Refills: 3 | Status: SHIPPED | OUTPATIENT
Start: 2018-10-23 | End: 2019-11-23 | Stop reason: SDUPTHER

## 2018-10-23 NOTE — TELEPHONE ENCOUNTER
----- Message from Daly Ambrosio sent at 10/23/2018 10:53 AM PDT -----  Regarding: RE: Non-Urgent Medical Question  Contact: [phone]  All 7  ----- Message -----  From: Mirna De La Fuente, Med Ass't  Sent: 10/23/2018  9:49 AM PDT  To: Daly Ambrosio  Subject: RE: Non-Urgent Medical Question  Main Kauffman,    Please name the medication that you need so we can see if we can fill this for 3 months.    Thank you.    ----- Message -----     From: Daly Ambrosio     Sent: 10/23/2018  9:12 AM PDT       To: Jade Talley M.D.  Subject: Non-Urgent Medical Question    My divorce was just finalized. Which means my insurance ends the end of the month. I need my meds for at least 3 months. What do I need to do?

## 2018-10-23 NOTE — TELEPHONE ENCOUNTER
Daly Morrison M.D. 2 hours ago (9:17 AM)         I am having issues swallowing and I am being told that it is because my aorta is pushing on my  esophagus is there anything in the testing that you did that could confirm that?         Daly Morrison M.D. 18 minutes ago (10:59 AM)         I have to go to [facility] on Tuesday For my esophagus.  Is there anyway somebody else can look at my chart?          Reassured pt that Echocardiogram from 11/2017 showed Normal Aorta, pt request for MD to take a look at her imaging and advice.     To RO-ADD, Dr Morrison unavailable

## 2018-10-24 NOTE — TELEPHONE ENCOUNTER
Daly Ambrosio   You 3 minutes ago (9:16 AM)         Is it possible to get that referral as a stat so that I can get it done otherwise they cannot get me in in till late next week? As you know I would really like to get it done before I go to [facility]      CTA Aorta changed to STAT

## 2018-10-24 NOTE — TELEPHONE ENCOUNTER
Discussed w/ Dr Pam Mota since Dr Ratliff is already gone. Per Dr Mota, we could do CTA Aorta if has not been done and pt agree, please check GFR first.     Called pt, discussed above recommendations by Dr Mota, pt reports Dr Heredia is her GI doctor and she was actually recommended to go to [place] for this condition. Advise pt to follow Dr Heredia recommendations, she agreed w/ CTA Aorta, imaging scheduling dept phone number [phone] provided to pt to schedule testing, notified her also of blood test prior to CT, pt reports she will to [facility] and will have it done tomorrow.     CTA Aorta and BUN/Crea, GFR ordered.     FYI to Dr Morrison

## 2018-10-25 ENCOUNTER — HOSPITAL ENCOUNTER (OUTPATIENT)
Dept: RADIOLOGY | Facility: MEDICAL CENTER | Age: 48
End: 2018-10-25
Attending: INTERNAL MEDICINE
Payer: COMMERCIAL

## 2018-10-25 ENCOUNTER — TELEPHONE (OUTPATIENT)
Dept: CARDIOLOGY | Facility: MEDICAL CENTER | Age: 48
End: 2018-10-25

## 2018-10-25 DIAGNOSIS — R13.19 OTHER DYSPHAGIA: ICD-10-CM

## 2018-10-25 DIAGNOSIS — I77.9 AORTA DISORDER (HCC): ICD-10-CM

## 2018-10-25 PROCEDURE — 700117 HCHG RX CONTRAST REV CODE 255: Performed by: INTERNAL MEDICINE

## 2018-10-25 PROCEDURE — 71275 CT ANGIOGRAPHY CHEST: CPT

## 2018-10-25 RX ADMIN — IOHEXOL 100 ML: 350 INJECTION, SOLUTION INTRAVENOUS at 12:15

## 2018-10-25 NOTE — TELEPHONE ENCOUNTER
STAT RESULTS CT/A   Received: Today Message Contents   Michelle Vitale R.N.   Phone Number: [phone]             LA/morales Das, from [facility] calling with STAT results on CT/A.   Dr Das said ok to call her right after lunch [phone].       Returned call from Dr. Das.  She states that atherosclerosis is seen with stenosis less than 50%.  Dr. Das states that results will be seen in results basket.    Results relayed for MD to review.

## 2018-10-29 DIAGNOSIS — F41.9 ANXIETY: ICD-10-CM

## 2018-10-29 RX ORDER — LORAZEPAM 1 MG/1
TABLET ORAL
Qty: 30 TAB | Refills: 0 | Status: SHIPPED
Start: 2018-10-29 | End: 2018-11-28

## 2018-10-29 NOTE — TELEPHONE ENCOUNTER
CTA showed no evidence of aortic abnormality.  There is a <50% stenosis of her subclavian artery which should not cause any problems right now, but will get worse with continued smoking.  No cause of any esophageal problems. Please let her know, thanks!

## 2018-10-29 NOTE — TELEPHONE ENCOUNTER
Called pt, discussed CTA result and Dr Morrison recommendations, pt appreciative and verbalizes understanding

## 2019-03-19 DIAGNOSIS — F41.9 ANXIETY: ICD-10-CM

## 2019-03-20 RX ORDER — LORAZEPAM 1 MG/1
TABLET ORAL
Qty: 30 TAB | Refills: 0 | Status: SHIPPED | OUTPATIENT
Start: 2019-03-20 | End: 2019-04-19

## 2019-04-30 ENCOUNTER — PATIENT MESSAGE (OUTPATIENT)
Dept: MEDICAL GROUP | Facility: PHYSICIAN GROUP | Age: 49
End: 2019-04-30

## 2019-04-30 DIAGNOSIS — F41.9 ANXIETY: ICD-10-CM

## 2019-04-30 RX ORDER — LORAZEPAM 1 MG/1
1 TABLET ORAL 2 TIMES DAILY
Qty: 60 TAB | Refills: 0 | Status: SHIPPED
Start: 2019-04-30 | End: 2019-05-30

## 2019-05-31 ENCOUNTER — PATIENT MESSAGE (OUTPATIENT)
Dept: MEDICAL GROUP | Facility: PHYSICIAN GROUP | Age: 49
End: 2019-05-31

## 2019-06-04 RX ORDER — PHENTERMINE HYDROCHLORIDE 37.5 MG/1
37.5 TABLET ORAL
Qty: 30 TAB | Refills: 0 | Status: SHIPPED | OUTPATIENT
Start: 2019-06-04 | End: 2019-06-11 | Stop reason: SDUPTHER

## 2019-06-11 RX ORDER — PHENTERMINE HYDROCHLORIDE 37.5 MG/1
37.5 TABLET ORAL
Qty: 30 TAB | Refills: 0 | Status: SHIPPED
Start: 2019-06-11 | End: 2019-07-11

## 2020-01-07 DIAGNOSIS — E89.0 HYPOTHYROIDISM, POSTSURGICAL: ICD-10-CM

## 2020-01-07 RX ORDER — LIOTHYRONINE SODIUM 25 UG/1
25 TABLET ORAL
Qty: 90 TAB | Refills: 0 | Status: SHIPPED | OUTPATIENT
Start: 2020-01-07

## 2020-01-07 NOTE — TELEPHONE ENCOUNTER
Was the patient seen in the last year in this department? No     Does patient have an active prescription for medications requested? No     Received Request Via: Pharmacy    Pt met protocol?: No    Last OV 05/01/2018    TSH   Date Value Ref Range Status   12/22/2017 7.640 (H) 0.380 - 5.330 uIU/mL Final     Comment:     Please note new reference ranges effective 12/14/2017 10:00 AM  Pregnant Females, 1st Trimester  0.050-3.700  Pregnant Females, 2nd Trimester  0.310-4.350  Pregnant Females, 3rd Trimester  0.410-5.180

## 2020-02-21 NOTE — TELEPHONE ENCOUNTER
Was the patient seen in the last year in this department? No     Does patient have an active prescription for medications requested? No     Received Request Via: Pharmacy      Pt met protocol?: No   Has not been seen here since 5/2018 pt may have moved out of state both pharmacies are in [place]

## 2020-02-25 RX ORDER — TOPIRAMATE 25 MG/1
TABLET ORAL
Qty: 60 TAB | Refills: 2 | OUTPATIENT
Start: 2020-02-25

## 2020-03-24 RX ORDER — ESTRADIOL 2 MG/1
2 TABLET ORAL
Qty: 30 TAB | Refills: 0 | OUTPATIENT
Start: 2020-03-24

## 2020-03-24 NOTE — TELEPHONE ENCOUNTER
Patient has been told on multiple occasions to make appt and that has not happened. Med is denied and pharmacy is aware. Pt has moved.

## 2020-03-30 RX ORDER — TOPIRAMATE 25 MG/1
TABLET ORAL
Qty: 60 TAB | Refills: 2 | OUTPATIENT
Start: 2020-03-30

## 2020-03-30 RX ORDER — ESTRADIOL 2 MG/1
2 TABLET ORAL
Qty: 30 TAB | Refills: 2 | OUTPATIENT
Start: 2020-03-30

## 2020-03-30 NOTE — TELEPHONE ENCOUNTER
Patient has been told on multiple occasions to make appt and that has not happened, note in file that she moved 4/19. Med is denied and pharmacy is aware.

## 2020-03-30 NOTE — TELEPHONE ENCOUNTER
Was the patient seen in the last year in this department? No     Does patient have an active prescription for medications requested? No     Received Request Via: Pharmacy      Pt met protocol?: No   Pt last ov 5/2018 has no upcoming appt   Lab Results   Component Value Date/Time    SODIUM 137 12/22/2017 10:07 AM    POTASSIUM 3.9 12/22/2017 10:07 AM    CHLORIDE 104 12/22/2017 10:07 AM    CO2 27 12/22/2017 10:07 AM    GLUCOSE 89 12/22/2017 10:07 AM    BUN 12 12/22/2017 10:07 AM    CREATININE 0.93 12/22/2017 10:07 AM

## 2023-12-31 NOTE — PROGRESS NOTES
Cardiology Follow-up Consultation Note    Date of note:    12/26/2017    Primary Care Provider: Jade Talley M.D.    Name:             Daly Ambrosio   YOB: 1970  MRN:               [id number]    CC: chest pain    Patient ID/HPI:   Daly Ambrosio is a 46 y.o. female whose current medical problems include bipolar disorder, JESSICA, obesity, hypothyroidism, and current smoking who is here for follow-up.     I originally saw her for atypical chest pain.     Interim History:  TTE and stress test WNL.     Doing over 10,000 steps a day.     Started on phentermine for weight loss.     Rare palpitations.     Review of Systems   Constitution: Negative for chills, fever and night sweats.   Eyes: Negative for blurred vision, vision loss in left eye and vision loss in right eye.   Cardiovascular: Positive for chest pain (rare, unchanged from previous). Negative for dyspnea on exertion, irregular heartbeat, leg swelling, orthopnea, paroxysmal nocturnal dyspnea and syncope.   Respiratory: Negative for shortness of breath and sputum production.    Gastrointestinal: Negative for hematemesis and hematochezia.   Genitourinary: Negative for hematuria.   Neurological: Negative for focal weakness.           Past Medical History:   Diagnosis Date   • Anesthesia     difficulty waking,  combative when waking   • Cigarette smoker    • Hiatus hernia syndrome    • Hypothyroidism, postsurgical    • Indigestion    • Psychiatric problem     bi-polar   • Sleep apnea     no cpap   • Snoring     has had sleep study   • thyroidectomy 2009    benign lymphocytic thyroiditis         Past Surgical History:   Procedure Laterality Date   • LYMPH NODE EXCISION  11/5/2014    Performed by Yasmany Polk M.D. at SURGERY SAME DAY [facility] ORS   • ESOPHAGEAL MOTILITY OR MANOMETRY  5/10/2013    Performed by Junior Frnak Jr., M.D. at ENDOSCOPY [facility] ORS   • ACL RECONSTRUCTION SCOPE  10/9/2012     Performed by Lambert Franco M.D. at SURGERY [facility] ORS   • MENISCECTOMY  10/9/2012    Performed by Lambert Franco M.D. at SURGERY [facility] ORS   • ACL RECONSTRUCTION SCOPE  8/19/2009    Performed by TAMIKO BRITT at SURGERY [facility] ORS   • THYROIDECTOMY TOTAL  6/3/2009    Performed by WYATT PEREZ at SURGERY SAME DAY [facility] ORS   • DEBRIDEMENT  2/11/2009    Performed by TAMIKO BRITT at SURGERY [facility] ORS   • BONE GRAFT  2/11/2009    Performed by TAMIKO BRITT at SURGERY [facility] ORS   • KNEE ARTHROSCOPY  2/11/2009    Performed by TAMIKO BRITT at SURGERY [facility] ORS   • HARDWARE REMOVAL ORTHO  2/11/2009    Performed by TAMIKO BRITT at SURGERY [facility] ORS   • KNEE ARTHROSCOPY  4/2008    left   • PB REMOVAL OF OVARY/TUBE(S)  2007   • HYSTERECTOMY LAPAROSCOPY  2007   • KNEE ARTHROSCOPY  2006    left   • PRIMARY C SECTION  2004   • ACL REPAIR  2002    right   • KNEE ARTHROSCOPY  2002    right   • ACL REPAIR  1994    right   • TONSILLECTOMY  1987         Current Outpatient Prescriptions   Medication Sig Dispense Refill   • phentermine 37.5 MG capsule Take 1 Cap by mouth every morning for 90 days. 90 Cap 0   • liothyronine (CYTOMEL) 25 MCG Tab TAKE 1 TAB BY MOUTH EVERY DAY. 90 Tab 2   • estradiol (ESTRACE) 2 MG Tab TAKE 1 TAB BY MOUTH EVERY DAY. 90 Tab 1   • SYNTHROID 200 MCG Tab TAKE 1 TAB BY MOUTH EVERY DAY. 90 Tab 2   • lamotrigine (LAMICTAL) 200 MG tablet TAKE 1 TAB BY MOUTH EVERY DAY. 90 Tab 2   • nicotine (NICODERM) 14 MG/24HR PATCH 24 HR Apply 1 Patch to skin as directed every 24 hours. (Patient not taking: Reported on 12/26/2017) 14 Patch 0   • lorazepam (ATIVAN) 1 MG Tab TAKE 1/2-1 TABLET BY MOUTH 2 TIMES A DAY AS NEEDED 30 Tab 0     No current facility-administered medications for this visit.          Allergies   Allergen Reactions   • Codeine Vomiting   • Norco [Hydrocodone-Acetaminophen] Vomiting   • Other Misc      adhesive   • Penicillins Rash   • Tape       "Causes blisters - can't do regular or paper tape - only coban   • Percocet [Oxycodone-Acetaminophen] Itching         Family History   Problem Relation Age of Onset   • Cancer Mother    • Lung Disease Mother      COPD   • Heart Disease Mother    • Heart Attack Mother 45   • Breast Cancer Mother    • Cancer Maternal Grandmother          Social History     Social History   • Marital status:      Spouse name: N/A   • Number of children: N/A   • Years of education: N/A     Occupational History   • Not on file.     Social History Main Topics   • Smoking status: Current Every Day Smoker     Packs/day: 0.50     Types: Cigarettes   • Smokeless tobacco: Never Used   • Alcohol use 0.0 - 1.2 oz/week      Comment: 5 PER YR   • Drug use:      Types: Marijuana   • Sexual activity: Not on file     Other Topics Concern   • Not on file     Social History Narrative   • No narrative on file         Physical Exam:  Ambulatory Vitals  Blood pressure 110/70, pulse 100, height 1.702 m (5' 7\"), weight 90.7 kg (200 lb), SpO2 96 %.   Oxygen Therapy:  Pulse Oximetry: 96 %  BP Readings from Last 4 Encounters:   12/26/17 110/70   12/22/17 122/74   10/26/17 120/70   10/17/17 104/82       Weight/BMI: Body mass index is 31.32 kg/m².  Wt Readings from Last 4 Encounters:   12/26/17 90.7 kg (200 lb)   12/22/17 91.2 kg (201 lb)   10/26/17 91.6 kg (202 lb)   10/17/17 91.6 kg (202 lb)       General: Well appearing and in no apparent distress  Eyes: nl conjunctiva  ENT: OP clear  Neck: JVP <7 cm H2O, no carotid bruits  Lungs: normal respiratory effort, CTAB  Heart: RRR, 2/6 mid systolic murmur at RUSB, no rubs or gallops, no edema bilateral lower extremities. No LV/RV heave on cardiac palpatation. 2+ bilateral radial pulses.  2+ bilateral dp pulses.   Abdomen: soft, non tender, non distended, no masses, normal bowel sounds.  No HSM.  Extremities/MSK: no clubbing, no cyanosis  Neurological: No focal sensory deficits  Psychiatric: Appropriate affect, " A/O x 3  Skin: Warm extremities      Lab Data Review:  Lab Results   Component Value Date/Time    CHOLSTRLTOT 196 12/15/2017 06:11 AM     (H) 12/15/2017 06:11 AM    HDL 48 12/15/2017 06:11 AM    TRIGLYCERIDE 85 12/15/2017 06:11 AM       Lab Results   Component Value Date/Time    SODIUM 137 12/22/2017 10:07 AM    POTASSIUM 3.9 12/22/2017 10:07 AM    CHLORIDE 104 12/22/2017 10:07 AM    CO2 27 12/22/2017 10:07 AM    GLUCOSE 89 12/22/2017 10:07 AM    BUN 12 12/22/2017 10:07 AM    CREATININE 0.93 12/22/2017 10:07 AM     Lab Results   Component Value Date/Time    ALKPHOSPHAT 80 12/22/2017 10:07 AM    ASTSGOT 16 12/22/2017 10:07 AM    ALTSGPT 15 12/22/2017 10:07 AM    TBILIRUBIN 0.3 12/22/2017 10:07 AM      Lab Results   Component Value Date/Time    WBC 10.2 12/22/2017 10:07 AM     No results found for: HBA1C  No components found for: TROP          Cardiac Imaging and Procedures Review:    EKG dated 10/17/17 : My personal interpretation is NSR, nonspecific ST changes inferior leads     Exercise stress testing 11/1/2017:  Duration       09:32    METS:   10.  % MaxPHR:     87     Double Product:       [id number]    Myocardial Perfusion   Report   NUCLEAR IMAGING INTERPRETATION   Normal left ventricular wall motion.  LV ejection fraction is normal.   Normal myocardial perfusion with no ischemia.     ECG INTERPRETATION   Negative stress ECG for ischemia.      11/1/2017 TTE:  FINDINGS  Left Ventricle  Normal left ventricular chamber size. Normal left ventricular wall   thickness. Normal left ventricular systolic function. Left ventricular   ejection fraction is visually estimated to be 70%. Normal diastolic   function. Normal regional wall motion.    Right Ventricle  Normal right ventricular size and systolic function.    Right Atrium  Normal right atrial size. Normal inferior vena cava size and   inspiratory collapse.    Left Atrium  Normal left atrial size.    Mitral Valve  Structurally normal mitral valve without  significant stenosis or   regurgitation.    Aortic Valve  Structurally normal aortic valve without significant stenosis or   regurgitation.    Tricuspid Valve  Structurally normal tricuspid valve. Mild tricuspid regurgitation.   Estimated right ventricular systolic pressure  is 25 mmHg.    Pulmonic Valve  The pulmonic valve is not well visualized. No stenosis or regurgitation   seen.    Pericardium  No pericardial effusion seen.    Aorta  Normal ascending aorta.         Medical Decision Makin. Other chest pain  Atypical, stress test negative, improving.  -CTM.      2. Moderate cigarette smoker (10-19 per day)  Interested in quitting, cannot afford nicotine patch/chantiz per report  [place] coverage for smoking cessation aides.      3. JESSICA (obstructive sleep apnea)  Continue O2 at night     4. Obesity (BMI 30-39.9)  -on phentermine     5. Dyspnea on exertion  Improved  -CTM    6. Screening for cardiovascular disease  -ASCVD risk score 2.7%  -recommend aspirin 81mg PO daily while smoking.       Return if symptoms worsen or fail to improve.      Brice Morrison MD  [facility]  [facility], [address]  [address] [address]  Phone: [phone]  Fax: [phone]   No